# Patient Record
Sex: FEMALE | Race: BLACK OR AFRICAN AMERICAN | Employment: FULL TIME | URBAN - METROPOLITAN AREA
[De-identification: names, ages, dates, MRNs, and addresses within clinical notes are randomized per-mention and may not be internally consistent; named-entity substitution may affect disease eponyms.]

---

## 2022-07-13 ENCOUNTER — TELEPHONE (OUTPATIENT)
Dept: OBGYN CLINIC | Age: 37
End: 2022-07-13

## 2022-07-13 NOTE — TELEPHONE ENCOUNTER
Pt present to the office for Depo-Provera injection.  Injection administered by Milton Willis CMA in the left buttock    She needs to return on 09/28 - 10/12    [facility] [id number]  Lot # [id number]  Exp 12/31/2025

## 2022-10-11 RX ORDER — MEDROXYPROGESTERONE ACETATE 150 MG/ML
150 INJECTION, SUSPENSION INTRAMUSCULAR
Qty: 1 ML | Refills: 0 | Status: SHIPPED | OUTPATIENT
Start: 2022-10-11

## 2022-10-12 ENCOUNTER — TELEPHONE (OUTPATIENT)
Dept: OBGYN CLINIC | Age: 37
End: 2022-10-12

## 2022-10-12 NOTE — TELEPHONE ENCOUNTER
Pt present to the office for Depo-Provera injection.  Injection administered by Vinson Cheadle , CMA in the Right buttock    She needs to return on 12/28 - 01/11/23    [facility] [address] [id number]  Lot # [id number]  Exp 12/31/2025

## 2022-12-29 RX ORDER — MEDROXYPROGESTERONE ACETATE 150 MG/ML
INJECTION, SUSPENSION INTRAMUSCULAR
Refills: 3 | OUTPATIENT
Start: 2022-12-29

## 2023-01-10 ENCOUNTER — TELEPHONE (OUTPATIENT)
Dept: OBGYN CLINIC | Age: 38
End: 2023-01-10

## 2023-01-10 NOTE — TELEPHONE ENCOUNTER
Pt present to the office for Depo-Provera injection.  Injection administered by Prashanth Peña CMA in the Left buttock    She needs to return on 03/28 - 04/11    [facility] [id number]  Lot # [id number]  Exp 04/30/2026

## 2023-03-31 RX ORDER — MEDROXYPROGESTERONE ACETATE 150 MG/ML
150 INJECTION, SUSPENSION INTRAMUSCULAR
OUTPATIENT
Start: 2023-03-31

## 2023-04-04 DIAGNOSIS — Z30.013 ENCOUNTER FOR PRESCRIPTION FOR DEPO-PROVERA: Primary | ICD-10-CM

## 2023-04-05 RX ORDER — MEDROXYPROGESTERONE ACETATE 150 MG/ML
150 INJECTION, SUSPENSION INTRAMUSCULAR
Qty: 1 ML | Refills: 0 | Status: SHIPPED | OUTPATIENT
Start: 2023-04-05

## 2023-04-05 NOTE — TELEPHONE ENCOUNTER
Pt needs WWE scheduled.     Orders Placed This Encounter    medroxyPROGESTERone (DEPO-PROVERA) 150 MG/ML injection     Sig: Inject 150 mg into the muscle every 3 months     Dispense:  1 mL     Refill:  0

## 2023-07-06 DIAGNOSIS — Z30.013 ENCOUNTER FOR PRESCRIPTION FOR DEPO-PROVERA: ICD-10-CM

## 2023-07-06 RX ORDER — MEDROXYPROGESTERONE ACETATE 150 MG/ML
150 INJECTION, SUSPENSION INTRAMUSCULAR
OUTPATIENT
Start: 2023-07-06

## 2023-07-11 DIAGNOSIS — Z30.013 ENCOUNTER FOR PRESCRIPTION FOR DEPO-PROVERA: ICD-10-CM

## 2023-07-11 RX ORDER — MEDROXYPROGESTERONE ACETATE 150 MG/ML
150 INJECTION, SUSPENSION INTRAMUSCULAR
OUTPATIENT
Start: 2023-07-11

## 2023-07-14 ENCOUNTER — HOSPITAL ENCOUNTER (EMERGENCY)
Age: 38
Discharge: HOME | End: 2023-07-14
Attending: GENERAL PRACTICE
Payer: COMMERCIAL

## 2023-07-14 ENCOUNTER — APPOINTMENT (OUTPATIENT)
Dept: GENERAL RADIOLOGY | Age: 38
End: 2023-07-14
Payer: COMMERCIAL

## 2023-07-14 VITALS
WEIGHT: 150 LBS | HEART RATE: 58 BPM | OXYGEN SATURATION: 99 % | RESPIRATION RATE: 15 BRPM | HEIGHT: 67 IN | BODY MASS INDEX: 23.54 KG/M2 | TEMPERATURE: 98.2 F | SYSTOLIC BLOOD PRESSURE: 122 MMHG | DIASTOLIC BLOOD PRESSURE: 87 MMHG

## 2023-07-14 DIAGNOSIS — S76.312A STRAIN OF LEFT HAMSTRING, INITIAL ENCOUNTER: ICD-10-CM

## 2023-07-14 DIAGNOSIS — S80.01XA CONTUSION OF RIGHT KNEE, INITIAL ENCOUNTER: Primary | ICD-10-CM

## 2023-07-14 PROCEDURE — 6360000002 HC RX W HCPCS: Performed by: GENERAL PRACTICE

## 2023-07-14 PROCEDURE — 73562 X-RAY EXAM OF KNEE 3: CPT

## 2023-07-14 PROCEDURE — 99284 EMERGENCY DEPT VISIT MOD MDM: CPT

## 2023-07-14 RX ORDER — KETOROLAC TROMETHAMINE 30 MG/ML
30 INJECTION, SOLUTION INTRAMUSCULAR; INTRAVENOUS ONCE
Status: COMPLETED | OUTPATIENT
Start: 2023-07-14 | End: 2023-07-14

## 2023-07-14 RX ADMIN — KETOROLAC TROMETHAMINE 30 MG: 30 INJECTION, SOLUTION INTRAMUSCULAR; INTRAVENOUS at 05:31

## 2023-07-14 ASSESSMENT — PAIN SCALES - GENERAL
PAINLEVEL_OUTOF10: 9
PAINLEVEL_OUTOF10: 9

## 2023-07-14 ASSESSMENT — LIFESTYLE VARIABLES
HOW OFTEN DO YOU HAVE A DRINK CONTAINING ALCOHOL: 2-3 TIMES A WEEK
HOW MANY STANDARD DRINKS CONTAINING ALCOHOL DO YOU HAVE ON A TYPICAL DAY: 1 OR 2
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
## 2023-07-23 DIAGNOSIS — Z30.013 ENCOUNTER FOR PRESCRIPTION FOR DEPO-PROVERA: ICD-10-CM

## 2023-07-24 ENCOUNTER — NURSE ONLY (OUTPATIENT)
Dept: OBGYN CLINIC | Age: 38
End: 2023-07-24

## 2023-07-24 DIAGNOSIS — Z30.013 ENCOUNTER FOR PRESCRIPTION FOR DEPO-PROVERA: ICD-10-CM

## 2023-07-24 RX ORDER — MEDROXYPROGESTERONE ACETATE 150 MG/ML
150 INJECTION, SUSPENSION INTRAMUSCULAR
Qty: 1 ML | Refills: 0 | Status: SHIPPED | OUTPATIENT
Start: 2023-07-24

## 2023-07-24 RX ORDER — MEDROXYPROGESTERONE ACETATE 150 MG/ML
150 INJECTION, SUSPENSION INTRAMUSCULAR
OUTPATIENT
Start: 2023-07-24

## 2023-07-24 NOTE — PROGRESS NOTES
Orders Placed This Encounter    medroxyPROGESTERone (DEPO-PROVERA) 150 MG/ML injection     Sig: Inject 150 mg into the muscle every 3 months     Dispense:  1 mL     Refill:  0        [facility] scheduled for 7/31/2023

## 2023-07-24 NOTE — PROGRESS NOTES
Patient showed for injection - did not have medication . Reports not having medication with her. Patient was suppose to schedule yearly exam in April to get next refill. She did not.  Patient scheduling today - will send in medication to get to yearly exam.

## 2023-07-27 ENCOUNTER — OFFICE VISIT (OUTPATIENT)
Dept: ORTHOPEDIC SURGERY | Age: 38
End: 2023-07-27
Payer: COMMERCIAL

## 2023-07-27 DIAGNOSIS — M70.50 PES ANSERINE BURSITIS: ICD-10-CM

## 2023-07-27 DIAGNOSIS — M25.561 ACUTE PAIN OF RIGHT KNEE: Primary | ICD-10-CM

## 2023-07-27 DIAGNOSIS — M25.562 CHRONIC PAIN OF LEFT KNEE: ICD-10-CM

## 2023-07-27 DIAGNOSIS — G89.29 CHRONIC PAIN OF LEFT KNEE: ICD-10-CM

## 2023-07-27 PROCEDURE — 99204 OFFICE O/P NEW MOD 45 MIN: CPT | Performed by: SPECIALIST/TECHNOLOGIST

## 2023-07-27 PROCEDURE — 20610 DRAIN/INJ JOINT/BURSA W/O US: CPT | Performed by: SPECIALIST/TECHNOLOGIST

## 2023-07-27 RX ORDER — DICLOFENAC SODIUM 75 MG/1
75 TABLET, DELAYED RELEASE ORAL 2 TIMES DAILY
Qty: 60 TABLET | Refills: 0 | Status: SHIPPED | OUTPATIENT
Start: 2023-07-27

## 2023-07-27 RX ORDER — TRIAMCINOLONE ACETONIDE 40 MG/ML
40 INJECTION, SUSPENSION INTRA-ARTICULAR; INTRAMUSCULAR ONCE
Status: COMPLETED | OUTPATIENT
Start: 2023-07-27 | End: 2023-07-27

## 2023-07-27 RX ADMIN — TRIAMCINOLONE ACETONIDE 40 MG: 40 INJECTION, SUSPENSION INTRA-ARTICULAR; INTRAMUSCULAR at 09:27

## 2023-07-27 NOTE — PROGRESS NOTES
patellar apprehension test, positive patellar grind. Imaging:   I reviewed 3 views of the left knee performed in the emergency department on 7/14/2023 which shows no acute fracture or dislocation, mild degenerative changes localized to the medial compartment with osteophyte formation. I reviewed 3 views of the right knee performed in the emergency department on 7/14/2023 which shows no acute fracture, dislocation or advanced degenerative changes. Tunnels and hardware consistent with postoperative ACL reconstruction. All imaging interpreted by myself Wilmer Friedman MD independent of radiology review        Assessment:     ICD-10-CM    1. Acute pain of right knee  M25.561 MRI KNEE RIGHT WO CONTRAST      2. Pes anserine bursitis  M70.50 MRI KNEE RIGHT WO CONTRAST     triamcinolone acetonide (KENALOG-40) injection 40 mg     DRAIN/INJECT LARGE JOINT/BURSA     Ambulatory referral to Physical Therapy      3. Chronic pain of left knee  M25.562 MRI KNEE RIGHT WO CONTRAST    G89.29 triamcinolone acetonide (KENALOG-40) injection 40 mg     DRAIN/INJECT LARGE JOINT/BURSA     Ambulatory referral to Physical Therapy          Plan:  I think the majority of the patient's right knee pain may be due to a meniscal pathology although I do think she does have an anterior contusion also which may be contributing to her pain. She did have an acute fall and was having no pain in this knee prior to the fall. In conjunction with her acute fall and her clinical exam I think it is reasonable to evaluate her right knee further with an MRI. She will return after MRI for definitive treatment. With regards to her left knee I think the majority of her symptoms are hamstring tendinitis and pes anserine bursitis. She also has some osteoarthritic changes on plain radiograph however I think the majority of her symptoms are extra-articular in nature.   I discussed with the patient conservative treatment options to include corticosteroid

## 2023-08-09 ENCOUNTER — OFFICE VISIT (OUTPATIENT)
Dept: OBGYN CLINIC | Age: 38
End: 2023-08-09
Payer: COMMERCIAL

## 2023-08-09 VITALS
HEIGHT: 67 IN | SYSTOLIC BLOOD PRESSURE: 122 MMHG | BODY MASS INDEX: 24.8 KG/M2 | DIASTOLIC BLOOD PRESSURE: 60 MMHG | WEIGHT: 158 LBS

## 2023-08-09 DIAGNOSIS — Z30.42 SURVEILLANCE FOR DEPO-PROVERA CONTRACEPTION: ICD-10-CM

## 2023-08-09 DIAGNOSIS — Z13.89 SCREENING FOR GENITOURINARY CONDITION: ICD-10-CM

## 2023-08-09 DIAGNOSIS — Z01.419 WELL WOMAN EXAM: Primary | ICD-10-CM

## 2023-08-09 DIAGNOSIS — Z30.013 ENCOUNTER FOR PRESCRIPTION FOR DEPO-PROVERA: ICD-10-CM

## 2023-08-09 LAB
BILIRUBIN, URINE, POC: NEGATIVE
BLOOD URINE, POC: NORMAL
GLUCOSE URINE, POC: NEGATIVE
HCG SERPL-ACNC: <1 MIU/ML (ref 0–6)
KETONES, URINE, POC: NEGATIVE
LEUKOCYTE ESTERASE, URINE, POC: NORMAL
NITRITE, URINE, POC: NEGATIVE
PH, URINE, POC: 5.5 (ref 4.6–8)
PROTEIN,URINE, POC: NORMAL
SPECIFIC GRAVITY, URINE, POC: 1.02 (ref 1–1.03)
URINALYSIS CLARITY, POC: CLEAR
URINALYSIS COLOR, POC: YELLOW
UROBILINOGEN, POC: NORMAL

## 2023-08-09 PROCEDURE — 81003 URINALYSIS AUTO W/O SCOPE: CPT | Performed by: NURSE PRACTITIONER

## 2023-08-09 PROCEDURE — 99395 PREV VISIT EST AGE 18-39: CPT | Performed by: NURSE PRACTITIONER

## 2023-08-09 RX ORDER — MEDROXYPROGESTERONE ACETATE 150 MG/ML
150 INJECTION, SUSPENSION INTRAMUSCULAR
Qty: 1 ML | Refills: 3 | Status: SHIPPED | OUTPATIENT
Start: 2023-08-09

## 2023-08-09 NOTE — PROGRESS NOTES
Patient presents today for a routine gynecological examination with no complaints. Needs depo refills. Outside of depo window- needs quant also     OB History          5    Para   5    Term   2            AB   0    Living   4         SAB   0    IAB        Ectopic        Molar        Multiple   1    Live Births   2              Last pap:2021 negative, hpv negative      GYN History           No LMP recorded (exact date). Patient has had an injection. negative postcoital bleeding    Past Medical History:  History reviewed. No pertinent past medical history. Past Surgical History:  Past Surgical History:   Procedure Laterality Date     SECTION      x4    ORTHOPEDIC SURGERY      OTHER SURGICAL HISTORY      oral    TUBAL LIGATION         Allergies:   No Known Allergies    Medication History:  Current Outpatient Medications   Medication Sig Dispense Refill    medroxyPROGESTERone (DEPO-PROVERA) 150 MG/ML injection Inject 150 mg into the muscle every 3 months 1 mL 3    diclofenac (VOLTAREN) 75 MG EC tablet Take 1 tablet by mouth 2 times daily 60 tablet 0     No current facility-administered medications for this visit.        Social History:  Social History     Socioeconomic History    Marital status: Single     Spouse name: Not on file    Number of children: Not on file    Years of education: Not on file    Highest education level: Not on file   Occupational History    Not on file   Tobacco Use    Smoking status: Every Day    Smokeless tobacco: Never   Substance and Sexual Activity    Alcohol use: Yes    Drug use: Never    Sexual activity: Not on file   Other Topics Concern    Not on file   Social History Narrative    Not on file     Social Determinants of Health     Financial Resource Strain: Not on file   Food Insecurity: Not on file   Transportation Needs: Not on file   Physical Activity: Not on file   Stress: Not on file   Social Connections: Not on file   Intimate Partner Violence: Not on

## 2023-08-10 ENCOUNTER — TELEPHONE (OUTPATIENT)
Dept: OBGYN CLINIC | Age: 38
End: 2023-08-10

## 2023-08-10 NOTE — TELEPHONE ENCOUNTER
Pt present to the office for Depo-Provera injection.   Neha Myers drawn on 08/09/2023 was negative    [facility] [id number]  Lot # [id number]  Exp 02/28/2027  Site LGM  Return 10/26 - 11/09 for next injection

## 2023-09-13 ENCOUNTER — OFFICE VISIT (OUTPATIENT)
Dept: ORTHOPEDIC SURGERY | Age: 38
End: 2023-09-13

## 2023-09-13 DIAGNOSIS — M17.11 PRIMARY OSTEOARTHRITIS OF RIGHT KNEE: Primary | ICD-10-CM

## 2023-09-13 DIAGNOSIS — M70.50 PES ANSERINE BURSITIS: ICD-10-CM

## 2023-09-13 RX ORDER — TRIAMCINOLONE ACETONIDE 40 MG/ML
40 INJECTION, SUSPENSION INTRA-ARTICULAR; INTRAMUSCULAR ONCE
Status: COMPLETED | OUTPATIENT
Start: 2023-09-13 | End: 2023-09-13

## 2023-09-13 RX ADMIN — TRIAMCINOLONE ACETONIDE 40 MG: 40 INJECTION, SUSPENSION INTRA-ARTICULAR; INTRAMUSCULAR at 08:45

## 2023-09-13 NOTE — PROGRESS NOTES
Name: Nataliia Zimmerman  YOB: 1985  Gender: female  MRN: [id number]      CC: Follow-up (Right knee MRI and left knee injection )       HPI: Nataliia Zimmerman is a 44 y.o. female who returns for follow up on  bilateral knees. She is here to today to discuss MRI results for her right knee. She reports that the left knee injection only provided relief for 1 week and that her right knee is more painful since her last visit. Reports that she had to leave work recently due to the right knee pain. Physical Examination:  General: no acute distress  Lungs: breathing easily  CV: regular rhythm by pulse  Right knee: Minimal effusion present. Tenderness to palpation over the medial and lateral joint line. Full active flexion with extension 2 degrees shy of full hyperextension compared to the contralateral side. Negative ligamentous exam x4. Negative Joao's, positive bounce home test.  Left knee: Minimal effusion present. Tenderness to palpation over the medial joint line. Full active and passive range of motion with mild pain in the extremes. Negative ligamentous exam x4. Negative Joao's, bounce home test.    Imaging: I reviewed an MRI performed in our system on 8/2/2023 which shows status post ACL reconstruction with intact graft. Cruciate and collateral ligaments and the meniscus are unremarkable. Moderate diffuse chondral thinning and surface irregularity with high-grade chondral loss patellar facet. Full-thickness chondral fissuring of the lateral trochlea. Full-thickness chondral fissuring in the anterior weightbearing surface of the lateral femoral condyle. Assessment:   1. Primary osteoarthritis of right knee    2. Pes anserine bursitis         Plan:   Patient reports that she has some mild achiness in her left knee but got improvement from the Pes anserine bursa injection however she did not attend formal physical therapy.   I would recommend that she be referred to

## 2023-10-26 ENCOUNTER — OFFICE VISIT (OUTPATIENT)
Dept: ORTHOPEDIC SURGERY | Age: 38
End: 2023-10-26

## 2023-10-26 DIAGNOSIS — M25.561 PAIN IN BOTH KNEES, UNSPECIFIED CHRONICITY: Primary | ICD-10-CM

## 2023-10-26 DIAGNOSIS — M25.562 PAIN IN BOTH KNEES, UNSPECIFIED CHRONICITY: Primary | ICD-10-CM

## 2023-10-26 NOTE — PROGRESS NOTES
Reddie Hinge brace for patients bilateral knee. I explained how the hinge on each side of the brace should line up with the patella. The patient was also instructed to tighten the strap distal to the patella first to insure the brace is anchored and prevents slipping, , then the top strap should be tightened. Patient read and signed documenting they understand and agree to [facility]'s current DME return policy.
valgus stress at full extension and 30 degrees of flexion. Negative Lachman's. Negative posterior drawer. No McMurrays over medial or lateral joint line. Calf is soft and nontender to palpation. Motor and sensory intact distally. Dorsalis pedis pulse 2+. Left knee:  No previous surgical scars present. No joint effusion present. Patella tracks centrally with no patellofemoral grind. Neutral mechanical alignment present. Passive ROM: 5 degrees of hyperextension with 120 degrees of flexion. Stable to varus and valgus stress at full extension and 30 degrees of flexion. Negative Lachman's. Negative posterior drawer. No pain with McMurrays over medial or lateral joint line. Calf is soft and nontender to palpation. Motor and sensory intact distally. Dorsalis pedis pulse 2+. I did review x-ray images of her right and left knee. I agree no acute findings noted. She does have arthritic changes present. On the left knee she has large osteophyte formation present along the femoral condyles and during skiers view has complete collapse of medial and lateral joint line. On the right knee she has the screws from her ACL reconstruction as well as some advanced arthritic changes present. All imaging interpreted by myself Florin Walton PA-C independent of radiology review    Assessment:     ICD-10-CM    1. Pain in both knees, unspecified chronicity  M25.561 Ambulatory Referral to DME    M25.562 Ambulatory referral to Physical Therapy          Plan:   I do think that she has some quad and hamstring weakness bilaterally, we discussed this at length. I know she will benefit from a formal physical therapy regimen. She states that she can get to [facility] in the morning before work and she will go to physical therapy 2 times a week for 6 to 8 weeks to work on quad strengthening. She notes some buckling and bouts of instability bilaterally.   I also think she will benefit from bilateral hinged braces which we

## 2023-11-02 ENCOUNTER — TELEPHONE (OUTPATIENT)
Dept: OBGYN CLINIC | Age: 38
End: 2023-11-02

## 2023-11-02 NOTE — TELEPHONE ENCOUNTER
Pt present to the office for Depo-Provera injection.       [facility] [id number]  Lot # [id number]  Exp 03/31/2027  Site RGM  Return 01/18 - 02/01/24 for next injection

## 2023-12-31 ENCOUNTER — APPOINTMENT (OUTPATIENT)
Dept: GENERAL RADIOLOGY | Age: 38
End: 2023-12-31
Payer: COMMERCIAL

## 2023-12-31 ENCOUNTER — HOSPITAL ENCOUNTER (EMERGENCY)
Age: 38
Discharge: HOME | End: 2023-12-31
Attending: EMERGENCY MEDICINE
Payer: COMMERCIAL

## 2023-12-31 ENCOUNTER — APPOINTMENT (OUTPATIENT)
Dept: CT IMAGING | Age: 38
End: 2023-12-31
Payer: COMMERCIAL

## 2023-12-31 VITALS
RESPIRATION RATE: 16 BRPM | WEIGHT: 150 LBS | SYSTOLIC BLOOD PRESSURE: 119 MMHG | HEART RATE: 71 BPM | HEIGHT: 67 IN | DIASTOLIC BLOOD PRESSURE: 87 MMHG | OXYGEN SATURATION: 99 % | BODY MASS INDEX: 23.54 KG/M2 | TEMPERATURE: 98.6 F

## 2023-12-31 DIAGNOSIS — M25.562 PAIN IN BOTH KNEES, UNSPECIFIED CHRONICITY: ICD-10-CM

## 2023-12-31 DIAGNOSIS — M25.561 PAIN IN BOTH KNEES, UNSPECIFIED CHRONICITY: ICD-10-CM

## 2023-12-31 DIAGNOSIS — S00.93XA CONTUSION OF HEAD, UNSPECIFIED PART OF HEAD, INITIAL ENCOUNTER: Primary | ICD-10-CM

## 2023-12-31 DIAGNOSIS — S00.03XA HEMATOMA OF LEFT PARIETAL SCALP, INITIAL ENCOUNTER: ICD-10-CM

## 2023-12-31 PROCEDURE — 70450 CT HEAD/BRAIN W/O DYE: CPT

## 2023-12-31 PROCEDURE — 96372 THER/PROPH/DIAG INJ SC/IM: CPT

## 2023-12-31 PROCEDURE — 99284 EMERGENCY DEPT VISIT MOD MDM: CPT

## 2023-12-31 PROCEDURE — 73562 X-RAY EXAM OF KNEE 3: CPT

## 2023-12-31 PROCEDURE — 6360000002 HC RX W HCPCS: Performed by: EMERGENCY MEDICINE

## 2023-12-31 RX ORDER — KETOROLAC TROMETHAMINE 30 MG/ML
30 INJECTION, SOLUTION INTRAMUSCULAR; INTRAVENOUS ONCE
Status: COMPLETED | OUTPATIENT
Start: 2023-12-31 | End: 2023-12-31

## 2023-12-31 RX ADMIN — KETOROLAC TROMETHAMINE 30 MG: 30 INJECTION, SOLUTION INTRAMUSCULAR at 06:01

## 2023-12-31 ASSESSMENT — PAIN DESCRIPTION - LOCATION
LOCATION: KNEE
LOCATION: HEAD;KNEE;BACK

## 2023-12-31 ASSESSMENT — PAIN SCALES - GENERAL
PAINLEVEL_OUTOF10: 8
PAINLEVEL_OUTOF10: 8

## 2023-12-31 ASSESSMENT — PAIN DESCRIPTION - DESCRIPTORS: DESCRIPTORS: ACHING;SHARP

## 2023-12-31 ASSESSMENT — LIFESTYLE VARIABLES
HOW MANY STANDARD DRINKS CONTAINING ALCOHOL DO YOU HAVE ON A TYPICAL DAY: 1 OR 2
HOW OFTEN DO YOU HAVE A DRINK CONTAINING ALCOHOL: 2-3 TIMES A WEEK

## 2023-12-31 ASSESSMENT — PAIN DESCRIPTION - ORIENTATION: ORIENTATION: RIGHT;LEFT

## 2023-12-31 ASSESSMENT — PAIN - FUNCTIONAL ASSESSMENT: PAIN_FUNCTIONAL_ASSESSMENT: 0-10
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
## 2023-12-31 NOTE — ED NOTES
I have reviewed discharge instructions with the patient.  The patient verbalized understanding.    Patient left ED via Discharge Method: ambulatory to Home with self.    Opportunity for questions and clarification provided.       Patient given 0 scripts.         To continue your aftercare when you leave the hospital, you may receive an automated call from our care team to check in on how you are doing.  This is a free service and part of our promise to provide the best care and service to meet your aftercare needs.” If you have questions, or wish to unsubscribe from this service please call [phone].  Thank you for Choosing our [facility] Emergency Department.       Tino Malin RN  12/31/23 0738
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
## 2024-01-10 ENCOUNTER — TELEPHONE (OUTPATIENT)
Dept: ORTHOPEDIC SURGERY | Age: 39
End: 2024-01-10

## 2024-01-10 NOTE — TELEPHONE ENCOUNTER
She was dancing on Saturday and her right knee cap felt like it popped out of place. She cannot weight bare and it is swollen. She wants to know if she can be worked in soon. She is in tears. Please give her a call.

## 2024-01-11 ENCOUNTER — OFFICE VISIT (OUTPATIENT)
Dept: ORTHOPEDIC SURGERY | Age: 39
End: 2024-01-11
Payer: COMMERCIAL

## 2024-01-11 VITALS — WEIGHT: 150 LBS | HEIGHT: 67 IN | BODY MASS INDEX: 23.54 KG/M2

## 2024-01-11 DIAGNOSIS — M25.561 ACUTE PAIN OF RIGHT KNEE: ICD-10-CM

## 2024-01-11 DIAGNOSIS — M17.11 PRIMARY OSTEOARTHRITIS OF RIGHT KNEE: Primary | ICD-10-CM

## 2024-01-11 PROCEDURE — 99214 OFFICE O/P EST MOD 30 MIN: CPT | Performed by: PHYSICIAN ASSISTANT

## 2024-01-11 RX ORDER — METHYLPREDNISOLONE 4 MG/1
TABLET ORAL
Qty: 1 KIT | Refills: 0 | Status: SHIPPED | OUTPATIENT
Start: 2024-01-11

## 2024-01-11 NOTE — PROGRESS NOTES
Name: Cyndi Fay  YOB: 1985  Gender: female  MRN: [id number]      CC: Knee Pain (R)       HPI: Cyndi Fay is a 38 y.o. female who presents with Knee Pain (R)  She was dancing at a birthday party and she felt a pop in the right knee this occurred on Saturday, 1/6/2024.. She was unable to bear weight. She had a brace at home that she started wearing. She has had no formal treatment.  She does not take anything for pain at this time.  She states that she has had previous patella dislocations in this right knee but this felt a little bit different.   She that she had significant swelling after this injury.  She is not able to put weight on this knee for long periods of time.  She is not able to go to work due to the increased pain and swelling.  She notes very little improvement from her injury on Saturday.    ROS/Meds/PSH/PMH/FH/SH: I personally reviewed the patients standard intake form.  Below are the pertinents    Tobacco:  reports that she has been smoking cigarettes. She has never used smokeless tobacco.  Diabetes: none  Other: none    Physical Examination:  General: no acute distress  Lungs: breathing easily  CV: regular rhythm by pulse  Right Knee: Previous surgical scars present. Large joint effusion present. Patella tracks centrally with no patellofemoral grind. Neutral mechanical alignment present. Passive ROM: 5-100 pain with all ROM.  Extensor mechanism intact.  Stable to varus and valgus stress at full extension and 30 degrees of flexion. Equivocal Lachman's. Equivocal posterior drawer. Pain with McMurrays over medial and lateral joint line. Calf is soft and nontender to palpation. Motor and sensory intact distally. Dorsalis pedis pulse 2+.      Imaging:     Knee XR: 4 views     Clinical Indication  1. Primary osteoarthritis of right knee    2. Acute pain of right knee           Report: AP, lateral, PA flexion, sunrise views of the Right knee demonstrates no acute fracture

## 2024-01-15 ENCOUNTER — CLINICAL DOCUMENTATION (OUTPATIENT)
Dept: ORTHOPEDIC SURGERY | Age: 39
End: 2024-01-15

## 2024-01-15 ENCOUNTER — TELEPHONE (OUTPATIENT)
Dept: ORTHOPEDIC SURGERY | Age: 39
End: 2024-01-15

## 2024-01-18 ENCOUNTER — TELEPHONE (OUTPATIENT)
Dept: ORTHOPEDIC SURGERY | Age: 39
End: 2024-01-18

## 2024-01-18 NOTE — TELEPHONE ENCOUNTER
Returning a call she missed about whether or not her form needs to be filled out now? Please call her and she can explain what Jenny has told her.

## 2024-01-19 ENCOUNTER — CLINICAL DOCUMENTATION (OUTPATIENT)
Dept: ORTHOPEDIC SURGERY | Age: 39
End: 2024-01-19

## 2024-01-22 NOTE — PROGRESS NOTES
Name: Cyndi Fay  YOB: 1985  Gender: female  MRN: [id number]      CC: Knee pain (R)       HPI: Cyndi Fay is a 38 y.o. female who returns for follow up and MRI results on right knee.        Physical Examination:  General: no acute distress  Lungs: breathing easily  CV: regular rhythm by pulse  {body part:18031}:***    Imaging:   ***  All imaging interpreted by myself Catherine Acosta PA-C independent of radiology review    Assessment:   No diagnosis found.     Plan:   ***            Catherine Acosta PA-C   Orthopaedics and Sports Medicine

## 2024-01-23 ENCOUNTER — OFFICE VISIT (OUTPATIENT)
Dept: ORTHOPEDIC SURGERY | Age: 39
End: 2024-01-23
Payer: COMMERCIAL

## 2024-01-23 ENCOUNTER — TELEPHONE (OUTPATIENT)
Dept: ORTHOPEDIC SURGERY | Age: 39
End: 2024-01-23

## 2024-01-23 DIAGNOSIS — S83.511A RUPTURE OF ANTERIOR CRUCIATE LIGAMENT OF RIGHT KNEE, INITIAL ENCOUNTER: ICD-10-CM

## 2024-01-23 DIAGNOSIS — M25.561 ACUTE PAIN OF RIGHT KNEE: ICD-10-CM

## 2024-01-23 DIAGNOSIS — S83.241A ACUTE MEDIAL MENISCUS TEAR OF RIGHT KNEE, INITIAL ENCOUNTER: Primary | ICD-10-CM

## 2024-01-23 PROCEDURE — 99204 OFFICE O/P NEW MOD 45 MIN: CPT | Performed by: ORTHOPAEDIC SURGERY

## 2024-01-23 NOTE — TELEPHONE ENCOUNTER
She was seen today and was supposed to call [facility] to schedule a CT of her knee. She called and they did not get the order. Can you please fax to them.

## 2024-01-23 NOTE — PROGRESS NOTES
Name: Cyndi Fay  YOB: 1985  Gender: female  MRN: [id number]    CC: Knee Pain (R)     HPI: Cyndi Fay is a 38 y.o. female who returns for follow up and MRI results on  her right knee.    Physical Examination:  General: no acute distress  Lungs: breathing easily  CV: regular rhythm by pulse  {body part:48729}:***    Imaging:   ***  All imaging interpreted by myself Wilmer Monterroso MD independent of radiology review    Assessment:   No diagnosis found.     Plan:   ***            Wilmer Monterroso MD, FAAOS  Orthopaedics and Sports Medicine

## 2024-01-23 NOTE — PROGRESS NOTES
Name: Cyndi Fay  YOB: 1985  Gender: female  MRN: [id number]    CC: Knee Pain (R)     HPI: Cyndi Fay is a 38 y.o. female who presents today for surgical consultation of her right knee and MRI results.  She was seen previously by Catherine Acosta.  She suffered an injury about 4 weeks ago while dancing in which she felt pop and shift of her knee with acute onset of pain and swelling.  She has a history of an ACL reconstruction about 20 years ago from a basketball injury.  She states she has not trusted her knee since that time and said intermittent giving way of her knee.  However she had severe increase in pain mostly over the medial joint line since this past injury.  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    Physical Examination:  General: no acute distress  Lungs: breathing easily  CV: regular rhythm by pulse  Right Knee:Tenderness to palpation of the medial joint line.  Recreation of symptoms with that.  Passive extension just shy of neutral.  Still has a mild to moderate effusion.  Pain with Joao's of the medial joint line with recreation of symptoms.  Stable to varus and valgus stress at 0 and 30 degrees.  She has positive Lachman's with a soft endpoint at most but asymmetry compared to the contralateral side.  Increased translation on anterior drawer.  Negative posterior drawer tibial station anterior to the femoral condyles.    Imaging:   I reviewed scan of her right knee which demonstrates a bucket-handle medial meniscus tear with the majority of the meniscus flipped into the intercondylar notch.  I see a small amount of remnant ACL graft tissue with laxity and I do not appreciate intact ACL.There is a posterior horn lateral meniscus tear     All imaging interpreted by myself Wilmer Monterroso MD independent of radiology review    Assessment:     ICD-10-CM    1. Acute medial meniscus tear of right knee, initial encounter  S83.241A       2. Acute pain of right knee  M25.561 CT KNEE

## 2024-01-26 DIAGNOSIS — M25.561 ACUTE PAIN OF RIGHT KNEE: ICD-10-CM

## 2024-01-29 ENCOUNTER — CLINICAL DOCUMENTATION (OUTPATIENT)
Dept: ORTHOPEDIC SURGERY | Age: 39
End: 2024-01-29

## 2024-02-01 ENCOUNTER — TELEPHONE (OUTPATIENT)
Dept: OBGYN CLINIC | Age: 39
End: 2024-02-01

## 2024-02-01 NOTE — TELEPHONE ENCOUNTER
Pt present to the office for Depo-Provera 150 mg/ml IM injection.     [facility] 72100-566-99  Lot # [id number]  Exp 04/30/2027  Site LGM  Return 04/19 - 05/03 for next injection

## 2024-02-02 ENCOUNTER — TELEPHONE (OUTPATIENT)
Dept: ORTHOPEDIC SURGERY | Age: 39
End: 2024-02-02

## 2024-02-02 ENCOUNTER — CLINICAL DOCUMENTATION (OUTPATIENT)
Dept: ORTHOPEDIC SURGERY | Age: 39
End: 2024-02-02

## 2024-02-02 NOTE — TELEPHONE ENCOUNTER
She is confused about the dates on the work note about remaining out prior to the CT scan, she wanted to know if that had to do with the [place] form

## 2024-02-05 NOTE — H&P (VIEW-ONLY)
discussed the risk of anesthesia complications postoperative numbness stiffness possible need for further surgery.  Risk of postoperative DVT/PE infection the extended rehab course associated with the procedure with a likely 12 month total recovery as well as the precautions associated with that.  All of the patient's questions have been answered and the patient elects to proceed as planned       Surgical plan to be for right knee arthroscopy revision ACL reconstruction with allograft and medial meniscus repair      Wilmer Monterroso MD, FAAOS  Orthopaedics and Sports Medicine

## 2024-02-06 ENCOUNTER — OFFICE VISIT (OUTPATIENT)
Dept: ORTHOPEDIC SURGERY | Age: 39
End: 2024-02-06
Payer: COMMERCIAL

## 2024-02-06 DIAGNOSIS — S83.241D ACUTE MEDIAL MENISCUS TEAR OF RIGHT KNEE, SUBSEQUENT ENCOUNTER: Primary | ICD-10-CM

## 2024-02-06 DIAGNOSIS — S83.511A RUPTURE OF ANTERIOR CRUCIATE LIGAMENT OF RIGHT KNEE, INITIAL ENCOUNTER: ICD-10-CM

## 2024-02-06 PROCEDURE — L1833 KO ADJ JNT POS R SUP PRE OTS: HCPCS | Performed by: ORTHOPAEDIC SURGERY

## 2024-02-06 PROCEDURE — E0114 CRUTCH UNDERARM PAIR NO WOOD: HCPCS | Performed by: ORTHOPAEDIC SURGERY

## 2024-02-06 PROCEDURE — 99214 OFFICE O/P EST MOD 30 MIN: CPT | Performed by: ORTHOPAEDIC SURGERY

## 2024-02-07 DIAGNOSIS — M70.50 PES ANSERINE BURSITIS: ICD-10-CM

## 2024-02-07 DIAGNOSIS — S83.241D ACUTE MEDIAL MENISCUS TEAR OF RIGHT KNEE, SUBSEQUENT ENCOUNTER: ICD-10-CM

## 2024-02-07 DIAGNOSIS — M25.561 ACUTE PAIN OF RIGHT KNEE: ICD-10-CM

## 2024-02-07 DIAGNOSIS — M17.11 PRIMARY OSTEOARTHRITIS OF RIGHT KNEE: ICD-10-CM

## 2024-02-07 DIAGNOSIS — S83.241A ACUTE MEDIAL MENISCUS TEAR OF RIGHT KNEE, INITIAL ENCOUNTER: ICD-10-CM

## 2024-02-07 DIAGNOSIS — S83.511A RUPTURE OF ANTERIOR CRUCIATE LIGAMENT OF RIGHT KNEE, INITIAL ENCOUNTER: Primary | ICD-10-CM

## 2024-02-07 NOTE — PROGRESS NOTES
The brace was properly aligned medially and laterally along the length of the right Knee with the extension/flexion dials placed slightly above the patella (to insure that if brace slides down slightly the dials will still line up on either side of the patella/knee region). The brace is extended/shorten to the patient's leg length and to insure proper fit of the brace. The straps are tightened starting with the most distal strap and then strap proximal to the patella. The strap right below the patella is then secured and last is the strap highest on the quad. The straps are then trimmed for easier tightening of the brace for the patient.     Patient was fitted and instruted on the use of a crutches. The crutches  was adjusted to the patient's height and arm length. Patient walked around with the crutches  to insure it was set at a comfortable height. I explained that patient needs tennis shoes on when using the crutches to insure no injury's .     Patient read and signed documenting they understand and agree to [facility]'s current DME return policy.   
discussed the risk of anesthesia complications postoperative numbness stiffness possible need for further surgery.  Risk of postoperative DVT/PE infection the extended rehab course associated with the procedure with a likely 12 month total recovery as well as the precautions associated with that.  All of the patient's questions have been answered and the patient elects to proceed as planned       Surgical plan to be for right knee arthroscopy revision ACL reconstruction with allograft and medial meniscus repair      Wilmer Monterroso MD, FAAOS  Orthopaedics and Sports Medicine

## 2024-02-19 NOTE — PERIOP NOTE
Patient verified name and .  Order for consent not found in EHR patient verifies procedure.   Type 1B surgery, Phone assessment complete.  Orders not received.  Labs per surgeon: none  Labs per anesthesia protocol: none    Patient answered medical/surgical history questions at their best of ability. All prior to admission medications documented in EPIC.  Patient instructed to take the following medications the day of surgery according to anesthesia guidelines with a small sip of water: none On the day before surgery please take 2 Tylenol in the morning and then again before bed. You may use either regular or extra strength.   Hold all vitamins 7 days prior to surgery and NSAIDS 5 days prior to surgery. Prescription meds to hold:none  Patient instructed on the following:    > Arrive at [facility]PC Entrance, time of arrival to be called the day before by 1700  > NPO after midnight, unless otherwise indicated, including gum, mints, and ice chips  > Responsible adult must drive patient to the hospital, stay during surgery, and patient will need supervision 24 hours after anesthesia  > Use non moisturizing soap in shower the night before surgery and on the morning of surgery  > All piercings must be removed prior to arrival.    > Leave all valuables (money and jewelry) at home but bring insurance card and ID on DOS.   > You may be required to pay a deductible or co-pay on the day of your procedure. You can pre-pay by calling [phone] if your surgery is at the [facility] or [phone] if [place] [facility]  > Do not wear make-up, nail polish, lotions, cologne, perfumes, powders, or oil on skin. Artificial nails are not permitted.

## 2024-02-20 ENCOUNTER — ANESTHESIA EVENT (OUTPATIENT)
Dept: SURGERY | Age: 39
End: 2024-02-20
Payer: COMMERCIAL

## 2024-02-20 DIAGNOSIS — S83.241D ACUTE MEDIAL MENISCUS TEAR OF RIGHT KNEE, SUBSEQUENT ENCOUNTER: Primary | ICD-10-CM

## 2024-02-20 RX ORDER — OXYCODONE HYDROCHLORIDE 5 MG/1
5 TABLET ORAL EVERY 4 HOURS PRN
Qty: 30 TABLET | Refills: 0 | Status: SHIPPED | OUTPATIENT
Start: 2024-02-20 | End: 2024-02-25

## 2024-02-20 NOTE — PROGRESS NOTES
Preop department called to notify patient of arrival time for scheduled procedure. Instructions given to   - Arrive at OPC Entrance [address].  - Remain NPO after midnight, unless otherwise indicated, including gum, mints, and ice chips.   - Have a responsible adult to drive patient to the hospital, stay during surgery, and patient will need supervision 24 hours after anesthesia.   - Use antibacterial soap in shower the night before surgery and on the morning of surgery.       Was patient contacted: YES  Voicemail left: yes  Numbers contacted: [phone]   Arrival time: 1330

## 2024-02-21 ENCOUNTER — APPOINTMENT (OUTPATIENT)
Dept: GENERAL RADIOLOGY | Age: 39
End: 2024-02-21
Attending: ORTHOPAEDIC SURGERY
Payer: COMMERCIAL

## 2024-02-21 ENCOUNTER — HOSPITAL ENCOUNTER (OUTPATIENT)
Age: 39
Setting detail: OBSERVATION
Discharge: HOME | End: 2024-02-22
Attending: ORTHOPAEDIC SURGERY | Admitting: ORTHOPAEDIC SURGERY
Payer: COMMERCIAL

## 2024-02-21 ENCOUNTER — ANESTHESIA (OUTPATIENT)
Dept: SURGERY | Age: 39
End: 2024-02-21
Payer: COMMERCIAL

## 2024-02-21 DIAGNOSIS — Z98.890 S/P RIGHT KNEE ARTHROSCOPY: Primary | ICD-10-CM

## 2024-02-21 DIAGNOSIS — Z76.89 ENCOUNTER TO ESTABLISH CARE: ICD-10-CM

## 2024-02-21 PROBLEM — Z09 STATUS POST ORTHOPEDIC SURGERY, FOLLOW-UP EXAM: Status: ACTIVE | Noted: 2024-02-21

## 2024-02-21 PROCEDURE — 6360000002 HC RX W HCPCS: Performed by: ANESTHESIOLOGY

## 2024-02-21 PROCEDURE — 3600000014 HC SURGERY LEVEL 4 ADDTL 15MIN: Performed by: ORTHOPAEDIC SURGERY

## 2024-02-21 PROCEDURE — 2580000003 HC RX 258: Performed by: ANESTHESIOLOGY

## 2024-02-21 PROCEDURE — 6360000002 HC RX W HCPCS: Performed by: PHYSICIAN ASSISTANT

## 2024-02-21 PROCEDURE — 7100000001 HC PACU RECOVERY - ADDTL 15 MIN: Performed by: ORTHOPAEDIC SURGERY

## 2024-02-21 PROCEDURE — 2709999900 HC NON-CHARGEABLE SUPPLY: Performed by: ORTHOPAEDIC SURGERY

## 2024-02-21 PROCEDURE — 3700000000 HC ANESTHESIA ATTENDED CARE: Performed by: ORTHOPAEDIC SURGERY

## 2024-02-21 PROCEDURE — 6370000000 HC RX 637 (ALT 250 FOR IP): Performed by: ANESTHESIOLOGY

## 2024-02-21 PROCEDURE — 6360000002 HC RX W HCPCS: Performed by: NURSE ANESTHETIST, CERTIFIED REGISTERED

## 2024-02-21 PROCEDURE — 6370000000 HC RX 637 (ALT 250 FOR IP): Performed by: PHYSICIAN ASSISTANT

## 2024-02-21 PROCEDURE — A4216 STERILE WATER/SALINE, 10 ML: HCPCS | Performed by: ORTHOPAEDIC SURGERY

## 2024-02-21 PROCEDURE — 3600000004 HC SURGERY LEVEL 4 BASE: Performed by: ORTHOPAEDIC SURGERY

## 2024-02-21 PROCEDURE — 2500000003 HC RX 250 WO HCPCS: Performed by: NURSE ANESTHETIST, CERTIFIED REGISTERED

## 2024-02-21 PROCEDURE — 6360000002 HC RX W HCPCS: Performed by: ORTHOPAEDIC SURGERY

## 2024-02-21 PROCEDURE — 3700000001 HC ADD 15 MINUTES (ANESTHESIA): Performed by: ORTHOPAEDIC SURGERY

## 2024-02-21 PROCEDURE — 2720000010 HC SURG SUPPLY STERILE: Performed by: ORTHOPAEDIC SURGERY

## 2024-02-21 PROCEDURE — C1762 CONN TISS, HUMAN(INC FASCIA): HCPCS | Performed by: ORTHOPAEDIC SURGERY

## 2024-02-21 PROCEDURE — 2580000003 HC RX 258: Performed by: ORTHOPAEDIC SURGERY

## 2024-02-21 PROCEDURE — C1713 ANCHOR/SCREW BN/BN,TIS/BN: HCPCS | Performed by: ORTHOPAEDIC SURGERY

## 2024-02-21 PROCEDURE — 6360000002 HC RX W HCPCS

## 2024-02-21 PROCEDURE — 7100000000 HC PACU RECOVERY - FIRST 15 MIN: Performed by: ORTHOPAEDIC SURGERY

## 2024-02-21 PROCEDURE — 2580000003 HC RX 258: Performed by: NURSE ANESTHETIST, CERTIFIED REGISTERED

## 2024-02-21 PROCEDURE — 64447 NJX AA&/STRD FEMORAL NRV IMG: CPT | Performed by: ANESTHESIOLOGY

## 2024-02-21 DEVICE — DEVICE GRFT FIX 4.75X19.1 MM BIOCOMPOSITE SWIVELOCK: Type: IMPLANTABLE DEVICE | Site: KNEE | Status: FUNCTIONAL

## 2024-02-21 DEVICE — IMPLANTABLE DEVICE: Type: IMPLANTABLE DEVICE | Site: KNEE | Status: FUNCTIONAL

## 2024-02-21 DEVICE — FAST FIX FLX CRVD INSRTR BNDR CANN SET
Type: IMPLANTABLE DEVICE | Site: KNEE | Status: FUNCTIONAL
Brand: FAST-FIX

## 2024-02-21 DEVICE — GRAFT HUM TISS L160MM ACHILLES TEND FRZN W/ BNE BLK: Type: IMPLANTABLE DEVICE | Site: KNEE | Status: FUNCTIONAL

## 2024-02-21 DEVICE — EXTENDER BTTN FOOTPRINT 20X5MM SLT FOR ACL RECON TIGHTROPE: Type: IMPLANTABLE DEVICE | Site: KNEE | Status: FUNCTIONAL

## 2024-02-21 DEVICE — DEVICE TISS FIX FIBERTAPE SUTURE FOR INTERNALBRACE TECH STRL: Type: IMPLANTABLE DEVICE | Site: KNEE | Status: FUNCTIONAL

## 2024-02-21 DEVICE — FST FIX FLX REV CRVD INSRTR BNDR                                    CANN ST
Type: IMPLANTABLE DEVICE | Site: KNEE | Status: FUNCTIONAL
Brand: FAST-FIX

## 2024-02-21 RX ORDER — KETAMINE HYDROCHLORIDE 50 MG/ML
INJECTION, SOLUTION INTRAMUSCULAR; INTRAVENOUS PRN
Status: DISCONTINUED | OUTPATIENT
Start: 2024-02-21 | End: 2024-02-21 | Stop reason: SDUPTHER

## 2024-02-21 RX ORDER — HALOPERIDOL 5 MG/ML
1 INJECTION INTRAMUSCULAR
Status: DISCONTINUED | OUTPATIENT
Start: 2024-02-21 | End: 2024-02-21 | Stop reason: HOSPADM

## 2024-02-21 RX ORDER — DOCUSATE SODIUM 100 MG/1
100 CAPSULE, LIQUID FILLED ORAL DAILY
Status: DISCONTINUED | OUTPATIENT
Start: 2024-02-22 | End: 2024-02-22 | Stop reason: HOSPADM

## 2024-02-21 RX ORDER — DEXAMETHASONE SODIUM PHOSPHATE 10 MG/ML
INJECTION INTRAMUSCULAR; INTRAVENOUS PRN
Status: DISCONTINUED | OUTPATIENT
Start: 2024-02-21 | End: 2024-02-21 | Stop reason: SDUPTHER

## 2024-02-21 RX ORDER — LIDOCAINE HYDROCHLORIDE 20 MG/ML
INJECTION, SOLUTION EPIDURAL; INFILTRATION; INTRACAUDAL; PERINEURAL PRN
Status: DISCONTINUED | OUTPATIENT
Start: 2024-02-21 | End: 2024-02-21 | Stop reason: SDUPTHER

## 2024-02-21 RX ORDER — SODIUM CHLORIDE 0.9 % (FLUSH) 0.9 %
5-40 SYRINGE (ML) INJECTION PRN
Status: DISCONTINUED | OUTPATIENT
Start: 2024-02-21 | End: 2024-02-21 | Stop reason: HOSPADM

## 2024-02-21 RX ORDER — DEXTROSE MONOHYDRATE 100 MG/ML
INJECTION, SOLUTION INTRAVENOUS CONTINUOUS PRN
Status: DISCONTINUED | OUTPATIENT
Start: 2024-02-21 | End: 2024-02-21 | Stop reason: HOSPADM

## 2024-02-21 RX ORDER — KETOROLAC TROMETHAMINE 30 MG/ML
INJECTION, SOLUTION INTRAMUSCULAR; INTRAVENOUS PRN
Status: DISCONTINUED | OUTPATIENT
Start: 2024-02-21 | End: 2024-02-21 | Stop reason: ALTCHOICE

## 2024-02-21 RX ORDER — KETOROLAC TROMETHAMINE 30 MG/ML
30 INJECTION, SOLUTION INTRAMUSCULAR; INTRAVENOUS EVERY 6 HOURS
Status: DISCONTINUED | OUTPATIENT
Start: 2024-02-21 | End: 2024-02-22 | Stop reason: HOSPADM

## 2024-02-21 RX ORDER — ONDANSETRON 4 MG/1
4 TABLET, ORALLY DISINTEGRATING ORAL EVERY 8 HOURS PRN
Status: DISCONTINUED | OUTPATIENT
Start: 2024-02-21 | End: 2024-02-22 | Stop reason: HOSPADM

## 2024-02-21 RX ORDER — SODIUM CHLORIDE 9 MG/ML
INJECTION, SOLUTION INTRAMUSCULAR; INTRAVENOUS; SUBCUTANEOUS PRN
Status: DISCONTINUED | OUTPATIENT
Start: 2024-02-21 | End: 2024-02-21 | Stop reason: ALTCHOICE

## 2024-02-21 RX ORDER — SODIUM CHLORIDE 0.9 % (FLUSH) 0.9 %
5-40 SYRINGE (ML) INJECTION PRN
Status: ACTIVE | OUTPATIENT
Start: 2024-02-21 | End: 2024-02-22

## 2024-02-21 RX ORDER — IBUPROFEN 600 MG/1
1 TABLET ORAL PRN
Status: DISCONTINUED | OUTPATIENT
Start: 2024-02-21 | End: 2024-02-21 | Stop reason: HOSPADM

## 2024-02-21 RX ORDER — OXYCODONE HYDROCHLORIDE 5 MG/1
5 TABLET ORAL
Status: COMPLETED | OUTPATIENT
Start: 2024-02-21 | End: 2024-02-21

## 2024-02-21 RX ORDER — SODIUM CHLORIDE 9 MG/ML
INJECTION, SOLUTION INTRAVENOUS PRN
Status: DISCONTINUED | OUTPATIENT
Start: 2024-02-21 | End: 2024-02-21 | Stop reason: HOSPADM

## 2024-02-21 RX ORDER — SCOLOPAMINE TRANSDERMAL SYSTEM 1 MG/1
1 PATCH, EXTENDED RELEASE TRANSDERMAL ONCE
Status: DISCONTINUED | OUTPATIENT
Start: 2024-02-21 | End: 2024-02-22 | Stop reason: HOSPADM

## 2024-02-21 RX ORDER — DIPHENHYDRAMINE HCL 25 MG
25 CAPSULE ORAL EVERY 6 HOURS PRN
Status: DISCONTINUED | OUTPATIENT
Start: 2024-02-21 | End: 2024-02-22 | Stop reason: HOSPADM

## 2024-02-21 RX ORDER — DIPHENHYDRAMINE HYDROCHLORIDE 50 MG/ML
12.5 INJECTION INTRAMUSCULAR; INTRAVENOUS
Status: DISCONTINUED | OUTPATIENT
Start: 2024-02-21 | End: 2024-02-21 | Stop reason: HOSPADM

## 2024-02-21 RX ORDER — FENTANYL CITRATE 50 UG/ML
INJECTION, SOLUTION INTRAMUSCULAR; INTRAVENOUS
Status: COMPLETED
Start: 2024-02-21 | End: 2024-02-21

## 2024-02-21 RX ORDER — ROPIVACAINE HYDROCHLORIDE 5 MG/ML
INJECTION, SOLUTION EPIDURAL; INFILTRATION; PERINEURAL PRN
Status: DISCONTINUED | OUTPATIENT
Start: 2024-02-21 | End: 2024-02-21 | Stop reason: ALTCHOICE

## 2024-02-21 RX ORDER — PROCHLORPERAZINE EDISYLATE 5 MG/ML
5 INJECTION INTRAMUSCULAR; INTRAVENOUS
Status: DISCONTINUED | OUTPATIENT
Start: 2024-02-21 | End: 2024-02-21 | Stop reason: HOSPADM

## 2024-02-21 RX ORDER — SODIUM CHLORIDE 9 MG/ML
INJECTION, SOLUTION INTRAVENOUS PRN
Status: ACTIVE | OUTPATIENT
Start: 2024-02-21 | End: 2024-02-22

## 2024-02-21 RX ORDER — EPHEDRINE SULFATE/0.9% NACL/PF 50 MG/5 ML
SYRINGE (ML) INTRAVENOUS PRN
Status: DISCONTINUED | OUTPATIENT
Start: 2024-02-21 | End: 2024-02-21 | Stop reason: SDUPTHER

## 2024-02-21 RX ORDER — SODIUM CHLORIDE, SODIUM LACTATE, POTASSIUM CHLORIDE, CALCIUM CHLORIDE 600; 310; 30; 20 MG/100ML; MG/100ML; MG/100ML; MG/100ML
INJECTION, SOLUTION INTRAVENOUS CONTINUOUS
Status: DISCONTINUED | OUTPATIENT
Start: 2024-02-21 | End: 2024-02-22 | Stop reason: HOSPADM

## 2024-02-21 RX ORDER — ROPIVACAINE HYDROCHLORIDE 5 MG/ML
INJECTION, SOLUTION EPIDURAL; INFILTRATION; PERINEURAL
Status: COMPLETED | OUTPATIENT
Start: 2024-02-21 | End: 2024-02-21

## 2024-02-21 RX ORDER — ACETAMINOPHEN 500 MG
1000 TABLET ORAL EVERY 8 HOURS PRN
Status: DISCONTINUED | OUTPATIENT
Start: 2024-02-21 | End: 2024-02-22 | Stop reason: HOSPADM

## 2024-02-21 RX ORDER — SODIUM CHLORIDE 9 MG/ML
INJECTION, SOLUTION INTRAVENOUS CONTINUOUS
Status: DISCONTINUED | OUTPATIENT
Start: 2024-02-21 | End: 2024-02-21 | Stop reason: HOSPADM

## 2024-02-21 RX ORDER — SODIUM CHLORIDE 0.9 % (FLUSH) 0.9 %
5-40 SYRINGE (ML) INJECTION EVERY 12 HOURS SCHEDULED
Status: DISCONTINUED | OUTPATIENT
Start: 2024-02-21 | End: 2024-02-21 | Stop reason: HOSPADM

## 2024-02-21 RX ORDER — HYDROMORPHONE HYDROCHLORIDE 2 MG/ML
INJECTION, SOLUTION INTRAMUSCULAR; INTRAVENOUS; SUBCUTANEOUS PRN
Status: DISCONTINUED | OUTPATIENT
Start: 2024-02-21 | End: 2024-02-21 | Stop reason: SDUPTHER

## 2024-02-21 RX ORDER — HYDROMORPHONE HYDROCHLORIDE 2 MG/ML
0.5 INJECTION, SOLUTION INTRAMUSCULAR; INTRAVENOUS; SUBCUTANEOUS EVERY 4 HOURS PRN
Status: DISCONTINUED | OUTPATIENT
Start: 2024-02-21 | End: 2024-02-22 | Stop reason: HOSPADM

## 2024-02-21 RX ORDER — MIDAZOLAM HYDROCHLORIDE 2 MG/2ML
2 INJECTION, SOLUTION INTRAMUSCULAR; INTRAVENOUS
Status: COMPLETED | OUTPATIENT
Start: 2024-02-21 | End: 2024-02-21

## 2024-02-21 RX ORDER — PROPOFOL 10 MG/ML
INJECTION, EMULSION INTRAVENOUS PRN
Status: DISCONTINUED | OUTPATIENT
Start: 2024-02-21 | End: 2024-02-21 | Stop reason: SDUPTHER

## 2024-02-21 RX ORDER — LIDOCAINE HYDROCHLORIDE 10 MG/ML
1 INJECTION, SOLUTION INFILTRATION; PERINEURAL
Status: DISCONTINUED | OUTPATIENT
Start: 2024-02-21 | End: 2024-02-21 | Stop reason: HOSPADM

## 2024-02-21 RX ORDER — ACETAMINOPHEN 500 MG
1000 TABLET ORAL ONCE
Status: COMPLETED | OUTPATIENT
Start: 2024-02-21 | End: 2024-02-21

## 2024-02-21 RX ORDER — DEXAMETHASONE SODIUM PHOSPHATE 10 MG/ML
INJECTION, SOLUTION INTRAMUSCULAR; INTRAVENOUS
Status: COMPLETED | OUTPATIENT
Start: 2024-02-21 | End: 2024-02-21

## 2024-02-21 RX ORDER — FENTANYL CITRATE 50 UG/ML
100 INJECTION, SOLUTION INTRAMUSCULAR; INTRAVENOUS ONCE
Status: DISCONTINUED | OUTPATIENT
Start: 2024-02-21 | End: 2024-02-21 | Stop reason: SDUPTHER

## 2024-02-21 RX ORDER — HYDROMORPHONE HYDROCHLORIDE 2 MG/ML
0.5 INJECTION, SOLUTION INTRAMUSCULAR; INTRAVENOUS; SUBCUTANEOUS EVERY 5 MIN PRN
Status: DISCONTINUED | OUTPATIENT
Start: 2024-02-21 | End: 2024-02-21 | Stop reason: HOSPADM

## 2024-02-21 RX ORDER — EPINEPHRINE 1 MG/ML(1)
AMPUL (ML) INJECTION PRN
Status: DISCONTINUED | OUTPATIENT
Start: 2024-02-21 | End: 2024-02-21 | Stop reason: ALTCHOICE

## 2024-02-21 RX ORDER — TRANEXAMIC ACID 100 MG/ML
INJECTION, SOLUTION INTRAVENOUS PRN
Status: DISCONTINUED | OUTPATIENT
Start: 2024-02-21 | End: 2024-02-21 | Stop reason: SDUPTHER

## 2024-02-21 RX ORDER — OXYCODONE HYDROCHLORIDE 5 MG/1
5 TABLET ORAL EVERY 4 HOURS PRN
Status: DISCONTINUED | OUTPATIENT
Start: 2024-02-21 | End: 2024-02-22 | Stop reason: HOSPADM

## 2024-02-21 RX ORDER — ONDANSETRON 2 MG/ML
INJECTION INTRAMUSCULAR; INTRAVENOUS PRN
Status: DISCONTINUED | OUTPATIENT
Start: 2024-02-21 | End: 2024-02-21 | Stop reason: SDUPTHER

## 2024-02-21 RX ORDER — SODIUM CHLORIDE, SODIUM LACTATE, POTASSIUM CHLORIDE, CALCIUM CHLORIDE 600; 310; 30; 20 MG/100ML; MG/100ML; MG/100ML; MG/100ML
INJECTION, SOLUTION INTRAVENOUS CONTINUOUS
Status: DISCONTINUED | OUTPATIENT
Start: 2024-02-21 | End: 2024-02-21 | Stop reason: HOSPADM

## 2024-02-21 RX ORDER — ASPIRIN 81 MG/1
81 TABLET ORAL 2 TIMES DAILY
Status: DISCONTINUED | OUTPATIENT
Start: 2024-02-21 | End: 2024-02-22 | Stop reason: HOSPADM

## 2024-02-21 RX ADMIN — KETAMINE HYDROCHLORIDE 20 MG: 50 INJECTION, SOLUTION INTRAMUSCULAR; INTRAVENOUS at 14:00

## 2024-02-21 RX ADMIN — HYDROMORPHONE HYDROCHLORIDE 0.5 MG: 2 INJECTION INTRAMUSCULAR; INTRAVENOUS; SUBCUTANEOUS at 14:33

## 2024-02-21 RX ADMIN — KETOROLAC TROMETHAMINE 30 MG: 30 INJECTION, SOLUTION INTRAMUSCULAR; INTRAVENOUS at 19:45

## 2024-02-21 RX ADMIN — MIDAZOLAM HYDROCHLORIDE 2 MG: 1 INJECTION, SOLUTION INTRAMUSCULAR; INTRAVENOUS at 13:00

## 2024-02-21 RX ADMIN — OXYCODONE HYDROCHLORIDE 5 MG: 5 TABLET ORAL at 17:46

## 2024-02-21 RX ADMIN — ACETAMINOPHEN 1000 MG: 500 TABLET ORAL at 12:55

## 2024-02-21 RX ADMIN — DIPHENHYDRAMINE HYDROCHLORIDE 25 MG: 25 CAPSULE ORAL at 23:41

## 2024-02-21 RX ADMIN — DEXAMETHASONE SODIUM PHOSPHATE 4 MG: 10 INJECTION, SOLUTION INTRAMUSCULAR; INTRAVENOUS at 13:00

## 2024-02-21 RX ADMIN — Medication 10 MG: at 14:15

## 2024-02-21 RX ADMIN — PROPOFOL 50 MG: 10 INJECTION, EMULSION INTRAVENOUS at 13:56

## 2024-02-21 RX ADMIN — LIDOCAINE HYDROCHLORIDE 20 MG: 20 INJECTION, SOLUTION EPIDURAL; INFILTRATION; INTRACAUDAL; PERINEURAL at 13:51

## 2024-02-21 RX ADMIN — DEXAMETHASONE SODIUM PHOSPHATE 10 MG: 10 INJECTION INTRAMUSCULAR; INTRAVENOUS at 14:15

## 2024-02-21 RX ADMIN — TRANEXAMIC ACID 1000 MG: 100 INJECTION, SOLUTION INTRAVENOUS at 14:00

## 2024-02-21 RX ADMIN — KETAMINE HYDROCHLORIDE 20 MG: 50 INJECTION, SOLUTION INTRAMUSCULAR; INTRAVENOUS at 15:00

## 2024-02-21 RX ADMIN — ONDANSETRON 4 MG: 2 INJECTION INTRAMUSCULAR; INTRAVENOUS at 14:15

## 2024-02-21 RX ADMIN — PHENYLEPHRINE HYDROCHLORIDE 100 MCG: 10 INJECTION INTRAVENOUS at 13:58

## 2024-02-21 RX ADMIN — FENTANYL CITRATE 100 MCG: 0.05 INJECTION, SOLUTION INTRAMUSCULAR; INTRAVENOUS at 13:00

## 2024-02-21 RX ADMIN — HYDROMORPHONE HYDROCHLORIDE 0.5 MG: 2 INJECTION INTRAMUSCULAR; INTRAVENOUS; SUBCUTANEOUS at 14:41

## 2024-02-21 RX ADMIN — SODIUM CHLORIDE, SODIUM LACTATE, POTASSIUM CHLORIDE, AND CALCIUM CHLORIDE: 600; 310; 30; 20 INJECTION, SOLUTION INTRAVENOUS at 14:33

## 2024-02-21 RX ADMIN — PROPOFOL 300 MG: 10 INJECTION, EMULSION INTRAVENOUS at 13:51

## 2024-02-21 RX ADMIN — ASPIRIN 81 MG: 81 TABLET, COATED ORAL at 19:45

## 2024-02-21 RX ADMIN — HYDROMORPHONE HYDROCHLORIDE 0.5 MG: 2 INJECTION INTRAMUSCULAR; INTRAVENOUS; SUBCUTANEOUS at 15:02

## 2024-02-21 RX ADMIN — OXYCODONE HYDROCHLORIDE 5 MG: 5 TABLET ORAL at 22:33

## 2024-02-21 RX ADMIN — HYDROMORPHONE HYDROCHLORIDE 0.5 MG: 2 INJECTION INTRAMUSCULAR; INTRAVENOUS; SUBCUTANEOUS at 14:21

## 2024-02-21 RX ADMIN — ROPIVACAINE HYDROCHLORIDE 30 ML: 5 INJECTION, SOLUTION EPIDURAL; INFILTRATION; PERINEURAL at 13:00

## 2024-02-21 RX ADMIN — PHENYLEPHRINE HYDROCHLORIDE 200 MCG: 10 INJECTION INTRAVENOUS at 14:09

## 2024-02-21 RX ADMIN — Medication 2 G: at 14:00

## 2024-02-21 RX ADMIN — PHENYLEPHRINE HYDROCHLORIDE 200 MCG: 10 INJECTION INTRAVENOUS at 16:46

## 2024-02-21 ASSESSMENT — PAIN SCALES - GENERAL
PAINLEVEL_OUTOF10: 7
PAINLEVEL_OUTOF10: 8
PAINLEVEL_OUTOF10: 0

## 2024-02-21 ASSESSMENT — PAIN DESCRIPTION - LOCATION
LOCATION: LEG
LOCATION: LEG

## 2024-02-21 ASSESSMENT — PAIN DESCRIPTION - DESCRIPTORS: DESCRIPTORS: ACHING

## 2024-02-21 ASSESSMENT — PAIN DESCRIPTION - ORIENTATION
ORIENTATION: RIGHT
ORIENTATION: RIGHT

## 2024-02-21 ASSESSMENT — ENCOUNTER SYMPTOMS: SHORTNESS OF BREATH: 1

## 2024-02-21 ASSESSMENT — PAIN - FUNCTIONAL ASSESSMENT
PAIN_FUNCTIONAL_ASSESSMENT: 0-10
PAIN_FUNCTIONAL_ASSESSMENT: PREVENTS OR INTERFERES SOME ACTIVE ACTIVITIES AND ADLS

## 2024-02-21 ASSESSMENT — LIFESTYLE VARIABLES: SMOKING_STATUS: 1

## 2024-02-21 ASSESSMENT — PAIN DESCRIPTION - ONSET: ONSET: GRADUAL

## 2024-02-21 ASSESSMENT — PAIN DESCRIPTION - PAIN TYPE: TYPE: SURGICAL PAIN

## 2024-02-21 ASSESSMENT — PAIN DESCRIPTION - FREQUENCY: FREQUENCY: INTERMITTENT

## 2024-02-21 NOTE — ANESTHESIA POSTPROCEDURE EVALUATION
Department of Anesthesiology  Postprocedure Note    Patient: Cyndi Fay  MRN: [id number]  YOB: 1985  Date of evaluation: 2/21/2024    Procedure Summary     Date: 02/21/24 Room / Location: D OP OR 03 / [facility] OPC    Anesthesia Start: 1342 Anesthesia Stop: 1738    Procedure: RIGHT KNEE ARTHROSCOPY REVISION ACL RECONSTRUCTION AND MEDIAL MENISCUS REPAIR    SUPINE (Right: Knee) Diagnosis:       Acute medial meniscus tear of right knee, subsequent encounter      Rupture of anterior cruciate ligament of right knee, initial encounter      Acute pain of right knee      Primary osteoarthritis of right knee      Pes anserine bursitis      (Acute medial meniscus tear of right knee, subsequent encounter [S83.241D])      (Rupture of anterior cruciate ligament of right knee, initial encounter [S83.511A])      (Acute pain of right knee [M25.561])      (Primary osteoarthritis of right knee [M17.11])      (Pes anserine bursitis [M70.50])    Surgeons: Wilmer Monterroso MD Responsible Provider: Nancy Conteh MD    Anesthesia Type: General ASA Status: 2          Anesthesia Type: General    Mackenzie Phase I: Mackenzie Score: 7    Mackenzie Phase II:      Anesthesia Post Evaluation    Patient location during evaluation: PACU  Patient participation: complete - patient participated  Level of consciousness: awake and alert  Airway patency: patent  Nausea: well controlled.  Cardiovascular status: acceptable.  Respiratory status: acceptable  Hydration status: stable  Pain management: adequate        No notable events documented.

## 2024-02-21 NOTE — ANESTHESIA PROCEDURE NOTES
Airway  Date/Time: 2/21/2024 1:52 PM  Urgency: elective    Airway not difficult    General Information and Staff    Patient location during procedure: OR  Anesthesiologist: Nancy Conteh MD  Resident/CRNA: Irma Brooke APRN - CRNA  Performed: resident/CRNA/CAA   Performed by: Irma Brooke APRN - CRNA  Authorized by: Nancy Conteh MD      Indications and Patient Condition  Indications for airway management: anesthesia  Spontaneous ventilation: present  Sedation level: deep  Preoxygenated: yes  Patient position: sniffing  MILS not maintained throughout  Mask difficulty assessment: vent by bag mask    Final Airway Details  Final airway type: supraglottic airway      Successful airway: oropharyngeal  Size 4  Cuff Pressure (cm H2O): 40     Number of attempts at approach: 1  Ventilation between attempts: bag mask  Number of other approaches attempted: 0    no

## 2024-02-21 NOTE — PERIOP NOTE
TRANSFER - OUT REPORT:    Verbal report given to [place] on Cyndi Fay  being transferred to [facility] for routine post-op       Report consisted of patient’s Situation, Background, Assessment and   Recommendations(SBAR).     Information from the following report(s) Nurse Handoff Report, Adult Overview, Surgery Report, and Event Log was reviewed with the receiving nurse.    Lines:   Peripheral IV 02/21/24 Left Antecubital (Active)   Site Assessment Clean, dry & intact 02/21/24 1734   Line Status Infusing 02/21/24 1734   Phlebitis Assessment No symptoms 02/21/24 1734   Infiltration Assessment 0 02/21/24 1734   Alcohol Cap Used No 02/21/24 1734   Dressing Status Clean, dry & intact 02/21/24 1734   Dressing Type Transparent 02/21/24 1734        Opportunity for questions and clarification was provided.      VTE prophylaxis orders have been written for Cyndi Fay.    Patient and family given floor number and nurses name.  Family updated re: pt status after security code verified.

## 2024-02-21 NOTE — PROGRESS NOTES
TRANSFER - IN REPORT:    Verbal report received from ALBER Victoria on Cyndi Fay  being received from PACU for routine progression of patient care      Report consisted of patient's Situation, Background, Assessment and   Recommendations(SBAR).     Information from the following report(s) Nurse Handoff Report was reviewed with the receiving nurse.    Opportunity for questions and clarification was provided.      Assessment will be completed upon patient's arrival to unit and care assumed.

## 2024-02-21 NOTE — ANESTHESIA PROCEDURE NOTES
Peripheral Block    Patient location during procedure: pre-op  Reason for block: post-op pain management and at surgeon's request  Start time: 2/21/2024 1:00 PM  End time: 2/21/2024 1:04 PM  Staffing  Performed: anesthesiologist   Anesthesiologist: Nancy Conteh MD  Performed by: Nancy Conteh MD  Authorized by: Nancy Conteh MD    Preanesthetic Checklist  Completed: patient identified, IV checked, site marked, risks and benefits discussed, surgical/procedural consents, equipment checked, pre-op evaluation, timeout performed, anesthesia consent given, oxygen available and monitors applied/VS acknowledged  Peripheral Block   Patient position: supine  Prep: ChloraPrep  Provider prep: sterile gloves and mask  Patient monitoring: cardiac monitor, continuous pulse ox, frequent blood pressure checks, IV access, oxygen and responsive to questions  Block type: Femoral  Adductor canal  Laterality: right  Injection technique: single-shot  Guidance: ultrasound guided    Needle   Needle type: insulated echogenic nerve stimulator needle   Needle gauge: 20 G  Needle localization: ultrasound guidance  Needle length: 10 cm  Assessment   Injection assessment: negative aspiration for heme, no paresthesia on injection, local visualized surrounding nerve on ultrasound and no intravascular symptoms  Paresthesia pain: none  Slow fractionated injection: yes  Hemodynamics: stable  Real-time US image taken/store: yes  Outcomes: uncomplicated and patient tolerated procedure well    Additional Notes  Ultrasound image taken and stored in chart   Medications Administered  dexAMETHasone (DECADRON) (PF) 10 mg/mL injection - Other   4 mg - 2/21/2024 1:00:00 PM  ropivacaine (NAROPIN) injection 0.5% - Perineural   30 mL - 2/21/2024 1:00:00 PM

## 2024-02-21 NOTE — INTERVAL H&P NOTE
Update History & Physical    The Patient's History and Physical was reviewed   I discussed the surgery and patients medical condition with the patient.  The chart was reviewed with the patient and I examined the patient.    There was no change from previous note.  The surgical site was confirmed by the patient and me.    CV: RRR  RESP: CTAB    Plan:  The risk, benefits, expected outcome, and alternative to the recommended procedure have been discussed with the patient.  Patient understands and elects to proceed with the procedure as planned.    Electronically signed by Wilmer Monterroso MD on 02/21/24 12:40 PM

## 2024-02-21 NOTE — FLOWSHEET NOTE
02/21/24 1645   Family Communication    Relationship to Patient Parent    Phone Number [phone]  (JOHANNE)   Family/Significant Other Update Called   Delivery Origin Nurse   Message Disposition Family present - message delivered   Update Given Yes   Family Communication   Family Update Message Surgeon working;Patient stable

## 2024-02-22 VITALS
OXYGEN SATURATION: 100 % | HEART RATE: 64 BPM | TEMPERATURE: 98.6 F | HEIGHT: 67 IN | DIASTOLIC BLOOD PRESSURE: 90 MMHG | WEIGHT: 150 LBS | SYSTOLIC BLOOD PRESSURE: 145 MMHG | BODY MASS INDEX: 23.54 KG/M2 | RESPIRATION RATE: 16 BRPM

## 2024-02-22 DIAGNOSIS — S83.511A RUPTURE OF ANTERIOR CRUCIATE LIGAMENT OF RIGHT KNEE, INITIAL ENCOUNTER: Primary | ICD-10-CM

## 2024-02-22 PROCEDURE — 6360000002 HC RX W HCPCS: Performed by: PHYSICIAN ASSISTANT

## 2024-02-22 PROCEDURE — 96374 THER/PROPH/DIAG INJ IV PUSH: CPT

## 2024-02-22 PROCEDURE — 97530 THERAPEUTIC ACTIVITIES: CPT

## 2024-02-22 PROCEDURE — 6370000000 HC RX 637 (ALT 250 FOR IP): Performed by: PHYSICIAN ASSISTANT

## 2024-02-22 PROCEDURE — 96376 TX/PRO/DX INJ SAME DRUG ADON: CPT

## 2024-02-22 PROCEDURE — 97161 PT EVAL LOW COMPLEX 20 MIN: CPT

## 2024-02-22 RX ORDER — KETOROLAC TROMETHAMINE 10 MG/1
10 TABLET, FILM COATED ORAL EVERY 6 HOURS PRN
Qty: 20 TABLET | Refills: 0 | Status: SHIPPED | OUTPATIENT
Start: 2024-02-22 | End: 2025-02-21

## 2024-02-22 RX ADMIN — KETOROLAC TROMETHAMINE 30 MG: 30 INJECTION, SOLUTION INTRAMUSCULAR; INTRAVENOUS at 02:36

## 2024-02-22 RX ADMIN — ASPIRIN 81 MG: 81 TABLET, COATED ORAL at 08:10

## 2024-02-22 RX ADMIN — DOCUSATE SODIUM 100 MG: 100 CAPSULE, LIQUID FILLED ORAL at 08:10

## 2024-02-22 RX ADMIN — OXYCODONE HYDROCHLORIDE 5 MG: 5 TABLET ORAL at 04:39

## 2024-02-22 RX ADMIN — KETOROLAC TROMETHAMINE 30 MG: 30 INJECTION, SOLUTION INTRAMUSCULAR; INTRAVENOUS at 08:09

## 2024-02-22 ASSESSMENT — PAIN SCALES - GENERAL
PAINLEVEL_OUTOF10: 0
PAINLEVEL_OUTOF10: 7

## 2024-02-22 ASSESSMENT — PAIN DESCRIPTION - ONSET: ONSET: GRADUAL

## 2024-02-22 ASSESSMENT — PAIN DESCRIPTION - DESCRIPTORS: DESCRIPTORS: ACHING

## 2024-02-22 ASSESSMENT — PAIN - FUNCTIONAL ASSESSMENT: PAIN_FUNCTIONAL_ASSESSMENT: PREVENTS OR INTERFERES SOME ACTIVE ACTIVITIES AND ADLS

## 2024-02-22 ASSESSMENT — PAIN DESCRIPTION - LOCATION: LOCATION: LEG

## 2024-02-22 ASSESSMENT — PAIN DESCRIPTION - ORIENTATION: ORIENTATION: RIGHT

## 2024-02-22 ASSESSMENT — PAIN DESCRIPTION - FREQUENCY: FREQUENCY: INTERMITTENT

## 2024-02-22 ASSESSMENT — PAIN DESCRIPTION - PAIN TYPE: TYPE: SURGICAL PAIN

## 2024-02-22 NOTE — PLAN OF CARE
Problem: Pain  Goal: Verbalizes/displays adequate comfort level or baseline comfort level  Outcome: Progressing     Problem: Safety - Adult  Goal: Free from fall injury  Outcome: Progressing     Problem: Discharge Planning  Goal: Discharge to home or other facility with appropriate resources  Outcome: Progressing  Flowsheets (Taken 2/21/2024 4783 by Awa Chavez, RN)  Discharge to home or other facility with appropriate resources: Identify barriers to discharge with patient and caregiver     Problem: ABCDS Injury Assessment  Goal: Absence of physical injury  Outcome: Progressing

## 2024-02-22 NOTE — CARE COORDINATION
Patient to be discharged home today. CM spoke with patient at bedside. Demographics verified. Patient lives alone. She is independent at baseline. She has crutches in the room. Office has arranged outpatient PT to start tomorrow. Patient has no PCP and is agreeable [place] [facility] [place] one. Referral placed. CM will remain available through discharge.     02/22/24 0942   Service Assessment   Patient Orientation Alert and Oriented   Cognition Alert   History Provided By Patient   Primary Caregiver Self   Support Systems Family Members   PCP Verified by CM No  ([facility] [place]   Prior Functional Level Independent in ADLs/IADLs   Current Functional Level Assistance with the following:   Can patient return to prior living arrangement Yes   Ability to make needs known: Good   Family able to assist with home care needs: Yes   Would you like for me to discuss the discharge plan with any other family members/significant others, and if so, who? No   Services At/After Discharge   Transition of Care Consult (CM Consult) N/A   Services At/After Discharge PT   Mode of Transport at Discharge Self   Confirm Follow Up Transport Self   Condition of Participation: Discharge Planning   The Plan for Transition of Care is related to the following treatment goals: Discharge home with outpatient therapy.       
36.4

## 2024-02-22 NOTE — THERAPY EVALUATION
ACUTE PHYSICAL THERAPY GOALS:   (Developed with and agreed upon by patient and/or caregiver.)  TDWB (25%) RLE, knee brace locked in extension for WB, allowed 0-90 degrees when seated  (1.) Cyndi Fay  will move from supine to sit and sit to supine , scoot up and down, and roll side to side with MODIFIED INDEPENDENCE within 7 treatment day(s).    (2.) Cyndi Fay will transfer from bed to chair and chair to bed with MODIFIED INDEPENDENCE using the least restrictive device within 7 treatment day(s).    (3.) Cyndi Fay will ambulate with MODIFIED INDEPENDENCE for 250 feet with the least restrictive device within 7 treatment day(s).   (4.) Cyndi Fay will perform standing static and dynamic balance activities x 15 minutes with MODIFIED INDEPENDENCE to improve safety within 7 treatment day(s).  (5.) Cyndi Fay will ascend and descend 15 stairs using bilateral hand rail(s) with MODIFIED INDEPENDENCE to improve functional mobility and safety within 7 treatment day(s).  (6.) Cyndi Fay will perform therapeutic exercises x 20 min for HEP with INDEPENDENCE to improve strength, endurance, and functional mobility within 7 treatment day(s).     PHYSICAL THERAPY Initial Assessment, Daily Note, and AM  (Link to Caseload Tracking: PT Visit Days : 1  Acknowledge Orders  Time In/Out  PT Charge Capture  Rehab Caseload Tracker    Cyndi Fay is a 38 y.o. female   PRIMARY DIAGNOSIS: S/P right knee arthroscopy  Acute medial meniscus tear of right knee, subsequent encounter [S83.241D]  Rupture of anterior cruciate ligament of right knee, initial encounter [S83.511A]  Acute pain of right knee [M25.561]  Primary osteoarthritis of right knee [M17.11]  Pes anserine bursitis [M70.50]  S/P right knee arthroscopy [Z98.890]  Status post orthopedic surgery, follow-up exam [Z09]  Procedure(s) (LRB):  RIGHT KNEE ARTHROSCOPY REVISION ACL RECONSTRUCTION AND MEDIAL

## 2024-02-22 NOTE — PROGRESS NOTES
I have reviewed discharge instructions with the patient.  The patient verbalized understanding.  RN removed IV without complications.    Left message on voicemail with results. Asked patient to call back with any questions.

## 2024-02-22 NOTE — PLAN OF CARE
Problem: Pain  Goal: Verbalizes/displays adequate comfort level or baseline comfort level  2/22/2024 0814 by Fariha Price, RN  Outcome: Progressing  Flowsheets (Taken 2/22/2024 0810)  Verbalizes/displays adequate comfort level or baseline comfort level: Encourage patient to monitor pain and request assistance  2/21/2024 2348 by Nayely Allred, RN  Outcome: Progressing     Problem: Safety - Adult  Goal: Free from fall injury  2/22/2024 0814 by Fariha Price, RN  Outcome: Progressing  2/21/2024 2348 by Nayely Allred, RN  Outcome: Progressing     Problem: Discharge Planning  Goal: Discharge to home or other facility with appropriate resources  2/22/2024 0814 by Fariha Price, RN  Outcome: Progressing  2/21/2024 2348 by Nayely Allred, RN  Outcome: Progressing  Flowsheets (Taken 2/21/2024 1829 by Awa Chavez, RN)  Discharge to home or other facility with appropriate resources: Identify barriers to discharge with patient and caregiver     Problem: ABCDS Injury Assessment  Goal: Absence of physical injury  2/22/2024 0814 by Fariha Price, RN  Outcome: Progressing  2/21/2024 2348 by Nayely Allred, RN  Outcome: Progressing

## 2024-02-22 NOTE — PLAN OF CARE
Problem: Pain  Goal: Verbalizes/displays adequate comfort level or baseline comfort level  2/22/2024 1308 by Fariha Price RN  Outcome: Completed  2/22/2024 0814 by Fariha Price RN  Outcome: Progressing  Flowsheets (Taken 2/22/2024 0810)  Verbalizes/displays adequate comfort level or baseline comfort level: Encourage patient to monitor pain and request assistance  2/21/2024 2348 by Nayely Allred, RN  Outcome: Progressing     Problem: Safety - Adult  Goal: Free from fall injury  2/22/2024 1308 by Fariha Price RN  Outcome: Completed  2/22/2024 0814 by Fariha Price RN  Outcome: Progressing  Flowsheets (Taken 2/22/2024 0810)  Free From Fall Injury: Instruct family/caregiver on patient safety  2/21/2024 2348 by Nayely Allred, RN  Outcome: Progressing     Problem: Discharge Planning  Goal: Discharge to home or other facility with appropriate resources  2/22/2024 1308 by Fariha Price RN  Outcome: Completed  2/22/2024 0814 by Fariha Price RN  Outcome: Progressing  Flowsheets (Taken 2/22/2024 0810)  Discharge to home or other facility with appropriate resources: Identify barriers to discharge with patient and caregiver  2/21/2024 2348 by Nayely Allred RN  Outcome: Progressing  Flowsheets (Taken 2/21/2024 1829 by Awa Chavez, RN)  Discharge to home or other facility with appropriate resources: Identify barriers to discharge with patient and caregiver     Problem: ABCDS Injury Assessment  Goal: Absence of physical injury  2/22/2024 1308 by Fariha Price, RN  Outcome: Completed  2/22/2024 0814 by Fariha Price RN  Outcome: Progressing  Flowsheets (Taken 2/22/2024 0810)  Absence of Physical Injury: Implement safety measures based on patient assessment  2/21/2024 2348 by Nayely Allred, RN  Outcome: Progressing

## 2024-02-22 NOTE — DISCHARGE INSTRUCTIONS
Post-op instructions for ACL Reconstruction / Meniscus Repair / Patellar Stabilization (Dr. Monterroso)    Day of Surgery:     DIET:   Begin with liquids and light foods (jell-o, soup, etc.). Progress to your normal diet if you are not nauseated.    MEDICATION:   1. Pain- Norco (hydrocodone/acetaminophen) or Oxycodone. If you are taking oxycodone, you may also take two (2) Tylenol (acetaminophen) 500mg tabs every eight (8) hours. Do not take Tylenol (acetaminophen) if you are given Norco as this medicine already contains acetaminophen. Do not drink alcohol while taking pain medication. Slowly wean off the pain medication as the pain improves.   2. Aspirin 81mg-Take one (1) tablet in the morning and at night each day x 2 weeks post-operatively. Begin the night of surgery.   3. Stool Softener-Pain medication can cause constipation. Be sure to drink plenty of water and take an over the counter stool softener as needed.     ICE:  Do NOT put the ice machine directly on your skin. Use it frequently for the first 72 hours. You may also use a regular ice bag/pack for 20 minutes at a time. Place a thin layer of clothing or pillow case between ice pack and your skin. Ice for 20-30 minutes on and then 20-30 minutes off.   If you are awake and comfortable or you have the surgical dressing still intact it is ok to use the ice machine more than 30 minutes at a time as long as you ensure it is not burning your skin.       SHOWERING:  No showering. Leave bandages in place.     ACTIVITY:  Keep leg elevated on a pillow placed under your ankle.   **DO NOT PUT A PILLOW UNDER YOUR KNEE!!**  It is important for you to keep your leg straight. Use crutches and put no weight on the operative leg. If you were given a brace, keep it on.    EXERCISES:  Begin ankle pumps.   Attempt quadriceps sets and straight leg raises (with brace on).        First & Second Post-Op Day:    MEDICATION: Continue as instructed as above.    BANDAGES: No

## 2024-02-22 NOTE — DISCHARGE SUMMARY
soln infusion   IntraVENous Continuous    scopolamine (TRANSDERM-SCOP) transdermal patch 1 patch  1 patch TransDERmal Once    oxyCODONE (ROXICODONE) immediate release tablet 5 mg  5 mg Oral Q4H PRN    ketorolac (TORADOL) injection 30 mg  30 mg IntraVENous Q6H    acetaminophen (TYLENOL) tablet 1,000 mg  1,000 mg Oral Q8H PRN    diphenhydrAMINE (BENADRYL) capsule 25 mg  25 mg Oral Q6H PRN    docusate sodium (COLACE) capsule 100 mg  100 mg Oral Daily    ondansetron (ZOFRAN-ODT) disintegrating tablet 4 mg  4 mg Oral Q8H PRN    HYDROmorphone HCl PF (DILAUDID) injection 0.5 mg  0.5 mg IntraVENous Q4H PRN    sodium chloride flush 0.9 % injection 5-40 mL  5-40 mL IntraVENous PRN    sodium chloride flush 0.9 % injection 5-40 mL  5-40 mL IntraVENous PRN    0.9 % sodium chloride infusion   IntraVENous PRN    aspirin EC tablet 81 mg  81 mg Oral BID         Additional DVT Prophylaxis:  AURE Hose, SCDs     Postoperative Blood Report: If the patient received blood products during their admission they are listed below:     No components found for: \"PCTEXX\"  No components found for: \"PCTABR\"  No results found for: \"ABORH\"  No components found for: \"PCTUN\"  No components found for: \"PCTCT\"  No components found for: \"PCTUDIV\"  No components found for: \"PCTXM\"    Post Op complications: none       Physical Therapy: PT was started on the day of surgery and progressed. She is safely able to get in and out of house, onto and off the toilet, and get around with restrictions at this time.     Disposition: Good    Upon Discharge:The wound appears to be healing without any evidence of infection.      Pt Discharged to: .    Discharge Medications:      Medication List        START taking these medications      ketorolac 10 MG tablet  Commonly known as: TORADOL  Take 1 tablet by mouth every 6 hours as needed for Pain            CONTINUE taking these medications      medroxyPROGESTERone 150 MG/ML injection  Commonly known as: DEPO-PROVERA  Inject

## 2024-02-23 ENCOUNTER — EVALUATION (OUTPATIENT)
Age: 39
End: 2024-02-23
Payer: COMMERCIAL

## 2024-02-23 ENCOUNTER — CLINICAL DOCUMENTATION (OUTPATIENT)
Dept: ORTHOPEDIC SURGERY | Age: 39
End: 2024-02-23

## 2024-02-23 DIAGNOSIS — M25.661 DECREASED RANGE OF MOTION OF RIGHT KNEE: ICD-10-CM

## 2024-02-23 DIAGNOSIS — Z78.9 IMPAIRED MOTOR CONTROL: ICD-10-CM

## 2024-02-23 DIAGNOSIS — R29.898 WEAKNESS OF BOTH LOWER EXTREMITIES: ICD-10-CM

## 2024-02-23 DIAGNOSIS — Z74.09 DECREASED FUNCTIONAL MOBILITY AND ENDURANCE: ICD-10-CM

## 2024-02-23 DIAGNOSIS — M25.561 ACUTE PAIN OF RIGHT KNEE: Primary | ICD-10-CM

## 2024-02-23 PROCEDURE — 97760 ORTHOTIC MGMT&TRAING 1ST ENC: CPT

## 2024-02-23 PROCEDURE — 97161 PT EVAL LOW COMPLEX 20 MIN: CPT

## 2024-02-23 PROCEDURE — 97110 THERAPEUTIC EXERCISES: CPT

## 2024-02-23 NOTE — PROGRESS NOTES
with involved LE demonstrating good functional strength for progression to dynamic loading  Pt will perform at least 10 consecutive repetitions of anterior step downs on 8 inch box (without dynamic knee valgus and good single limb stability symmetrical to contralateral side) demonstrating appropriate eccentric control and functional strength for progression to dynamic loading  Pt will perform Y balance test with composite score at least 90% of uninvolved limb demonstrating appropriate eccentric control and balance.  Pt will perform Single leg, Triple, and Crossover Hop Test within 70% of uninvolved limb demonstrating appropriate control, balance, and strength for progression to dynamic loading  Improve IKDC to ? 64/87 demonstrating decreased functional restrictions with ADLs, work, and dynamic loading  Pt will return to work with modifications or restricted duty.     Goals for progression to return to high level activities  Pt will demonstrate knee extension and flexion strength of involved limb at greater than or equal to 90% of uninvolved limb for reduced injury risk with return to prior level of function  Pt will demonstrate MMT of 5/5 globally to allow for normal ADL's and functional activities and for reduced injury risk with return to prior level of function  Pt will perform at least 90% of repetitions of single leg squat in 30s with involved LE relative to uninvolved LE demonstrating good functional strength and control for reduced injury risk with return to prior level of function  Pt will perform at least 90% of repetitions of anterior step downs on 8 inch box in 30s with involved LE relative to uninvolved LE (without dynamic knee valgus and good single limb stability symmetrical to contralateral side) demonstrating appropriate eccentric control and functional strength for reduced injury risk with return to prior level of function  Pt will perform Y balance test with anterior reach within 4cm and posterior

## 2024-02-27 ENCOUNTER — TELEPHONE (OUTPATIENT)
Age: 39
End: 2024-02-27

## 2024-02-27 NOTE — TELEPHONE ENCOUNTER
Pt did not show for their scheduled therapy appointment today.    Reason: unknown  Communication: KASHIF, reminded of appointment on 3/1, requested she contact [phone] to confirm

## 2024-03-01 ENCOUNTER — CLINICAL DOCUMENTATION (OUTPATIENT)
Dept: ORTHOPEDIC SURGERY | Age: 39
End: 2024-03-01

## 2024-03-01 NOTE — PROGRESS NOTES
Name: Cyndi Fay  YOB: 1985  Gender: female  MRN: [id number]      Procedure: Right Knee Arthroscopy Revision Acl Reconstruction And Medial Meniscus Repair    Supine - Right    Surgery Date: 2/21/2024    Subjective: Returns 2 weeks status ACL reconstruction.  Pain is controlled better on Norco, improving with motion.  She is seeing [facility] at [facility] for physical therapy.      Physical Examination: Incisions are healing well.  Able to activate quad but has appropriate atrophy.  Passive extension 2-3 degrees short of extension flexion to 70 degrees.  Patella is mobile. Moderate effusion.  Motor and sensory intact.    Assessment:   S/P ACL reconstruction and medial meniscal repair      Plan:     Suture tails clipped Steri-Strips changed today.  Advised ice and compression therapy.  Continue PT per Allograft ACL reconstruction and medial meniscus repair protocol.  0 to 90 degrees range of motion x 4 weeks touchdown weightbearing less than 25% x 6 weeks due to the bucket-handle medial meniscus tear     Reviewed appropriate postoperative precautions at this point    Images were taken in the OR    Follow up: 4 weeks

## 2024-03-01 NOTE — PROGRESS NOTES
Name: Cyndi Fay  YOB: 1985  Gender: female  MRN: [id number]      Procedure: Right Knee Arthroscopy Revision Acl Reconstruction And Medial Meniscus Repair    Supine - Right    Surgery Date: 2/21/2024          Subjective: Returns 2 weeks status ACL reconstruction.  Pain is controlled improving with motion.  ***      Physical Examination: Incisions are healing well.  Able to activate quad but has appropriate atrophy.  Passive extension to about *** degrees of hyperextension flexion to *** degrees.  Patella is mobile. *** appropriate effusion.  Motor and sensory intact.       Imaging:  Knee XR: 2 views     Clinical Indication  No diagnosis found.       Report: AP, lateral, views of the {RIGHT/LEFT:16} knee demonstrates postsurgical changes from ACL reconstruction with tunnels and fixation hardware in appropriate position    Impression: Status post ACL reconstruction as above         Assessment:   S/P ACL reconstruction      Plan:     Suture tails clipped Steri-Strips changed today.  Advised ice and compression therapy.  Continue PT per *** ACL reconstruction protocol.  Reviewed appropriate postoperative precautions at this point    Follow up: 4 weeks      Catherine Acosta PA-C  Orthopaedics and Sports Medicine

## 2024-03-04 ENCOUNTER — TREATMENT (OUTPATIENT)
Age: 39
End: 2024-03-04
Payer: COMMERCIAL

## 2024-03-04 ENCOUNTER — TELEPHONE (OUTPATIENT)
Dept: ORTHOPEDIC SURGERY | Age: 39
End: 2024-03-04

## 2024-03-04 DIAGNOSIS — Z74.09 DECREASED FUNCTIONAL MOBILITY AND ENDURANCE: ICD-10-CM

## 2024-03-04 DIAGNOSIS — M25.661 DECREASED RANGE OF MOTION OF RIGHT KNEE: ICD-10-CM

## 2024-03-04 DIAGNOSIS — R29.898 WEAKNESS OF BOTH LOWER EXTREMITIES: ICD-10-CM

## 2024-03-04 DIAGNOSIS — S83.511A RUPTURE OF ANTERIOR CRUCIATE LIGAMENT OF RIGHT KNEE, INITIAL ENCOUNTER: ICD-10-CM

## 2024-03-04 DIAGNOSIS — Z78.9 IMPAIRED MOTOR CONTROL: ICD-10-CM

## 2024-03-04 DIAGNOSIS — M25.561 ACUTE PAIN OF RIGHT KNEE: Primary | ICD-10-CM

## 2024-03-04 DIAGNOSIS — S83.241D ACUTE MEDIAL MENISCUS TEAR OF RIGHT KNEE, SUBSEQUENT ENCOUNTER: Primary | ICD-10-CM

## 2024-03-04 PROCEDURE — 97110 THERAPEUTIC EXERCISES: CPT

## 2024-03-04 PROCEDURE — 97140 MANUAL THERAPY 1/> REGIONS: CPT

## 2024-03-04 RX ORDER — HYDROCODONE BITARTRATE AND ACETAMINOPHEN 5; 325 MG/1; MG/1
1 TABLET ORAL EVERY 6 HOURS PRN
Qty: 20 TABLET | Refills: 0 | Status: SHIPPED | OUTPATIENT
Start: 2024-03-04 | End: 2024-03-09

## 2024-03-04 NOTE — PROGRESS NOTES
[facility]TH[facility]  [address] [place] [id number]  Dept: [phone]      Physical Therapy Daily Note     Referring MD: Michelle Monahan ATC  Diagnosis:     ICD-10-CM    1. Acute pain of right knee  M25.561       2. Decreased range of motion of right knee  M25.661       3. Weakness of both lower extremities  R29.898       4. Impaired motor control  Z78.9       5. Decreased functional mobility and endurance  Z74.09          Surgery:   1) right knee arthroscopically assisted revision ACL reconstruction with Achilles allograft   2) right  Knee arthroscopy with inside-out medial meniscus repair  3) removal of hardware right tibial interference screw   Date: 2/21/24  Therapy precautions:None  Rehab protocol:  Allograft ACL reconstruction and medial meniscus repair protocol.  0 to 90 degrees range of motion x 4 weeks touchdown weightbearing less than 25% x 6 weeks due to the bucket-handle medial meniscus tear     Co-morbidities affecting plan of care: smoker, previous ACL R, use of allograft    Chief complaints/history of injury: Patient reports original injury occurred in January 2024 while dancing; she felt a pop in the R knee, with immediate pain and unable to bear weight. She notes a history of R patellar dislocations, along with a previous ACL reconstruction following a basketball injury. Following evaluation by orthopedics, she underwent additional ACL reconstruction.    Date symptoms began: 2/21/24  Nature of condition: Recent onset (initial onset within last 3 months)  Primary cause of current episode: Post-surgical  How did symptoms start: see above  Describe current symptoms: diffuse pain in R knee    Received previous outpatient therapy? No    Pain Assessment:  Pain location: RLE (diffuse, mid thigh and below)    Average Pain/symptom intensity (0-10 scale)  Last 24 hours: 7/10  Last week (1-7 days): 7/10  How often do you feel symptoms? Constant (% of time)  Description:

## 2024-03-05 ENCOUNTER — OFFICE VISIT (OUTPATIENT)
Dept: ORTHOPEDIC SURGERY | Age: 39
End: 2024-03-05

## 2024-03-05 DIAGNOSIS — Z09 S/P ORTHOPEDIC SURGERY, FOLLOW-UP EXAM: Primary | ICD-10-CM

## 2024-03-05 PROCEDURE — 99024 POSTOP FOLLOW-UP VISIT: CPT | Performed by: ORTHOPAEDIC SURGERY

## 2024-03-07 ENCOUNTER — CLINICAL DOCUMENTATION (OUTPATIENT)
Dept: ORTHOPEDIC SURGERY | Age: 39
End: 2024-03-07

## 2024-03-12 ENCOUNTER — TELEPHONE (OUTPATIENT)
Age: 39
End: 2024-03-12

## 2024-03-12 NOTE — TELEPHONE ENCOUNTER
Pt did not show for their scheduled therapy appointment today.    Reason: transportation  Communication: called/spoke with patient

## 2024-03-21 ENCOUNTER — TREATMENT (OUTPATIENT)
Age: 39
End: 2024-03-21

## 2024-03-21 DIAGNOSIS — R29.898 WEAKNESS OF BOTH LOWER EXTREMITIES: ICD-10-CM

## 2024-03-21 DIAGNOSIS — Z74.09 DECREASED FUNCTIONAL MOBILITY AND ENDURANCE: ICD-10-CM

## 2024-03-21 DIAGNOSIS — M25.661 DECREASED RANGE OF MOTION OF RIGHT KNEE: ICD-10-CM

## 2024-03-21 DIAGNOSIS — Z78.9 IMPAIRED MOTOR CONTROL: ICD-10-CM

## 2024-03-21 DIAGNOSIS — M25.561 ACUTE PAIN OF RIGHT KNEE: Primary | ICD-10-CM

## 2024-03-21 NOTE — PROGRESS NOTES
ADLs:  >40 SLR w/ no lag  30 reps calf raise  SL balance 15-25s (no brace)  Pt will walk safely with unlocked brace and without use of assistive device; allowing for improved household and community mobility  Pt will perform at least 15 repetitions in 30s Chair Stand assessment, keeping weight evenly distributed through R and L LEs; demonstrating improved function of surgical LE and a reduction in compensations due to pain and weakness.   Pt will independently ascend and descend steps with a reciprocal pattern, demonstrating good eccentric control and balance.  Improve IKDC to 55/87 demonstrating improved functional mobility.    Goals for progression to running (minimum of 12 weeks post operative)  Pt will walk with normal gait for community mobility and d/c use of brace with all activities.    Pt will demonstrate knee extension and flexion strength of involved limb within 70% of uninvolved limb for progression to dynamic loading  Pt will demonstrate pain free flexion of involved LE within 95% of uninvolved limb for improved squatting and functional mobility.     Pt will demonstrate MMT of 4+/5 globally for maximal stability with gait.   Pt will perform at least 10 repetitions of single leg squat with involved LE demonstrating good functional strength for progression to dynamic loading  Pt will perform at least 10 consecutive repetitions of anterior step downs on 8 inch box (without dynamic knee valgus and good single limb stability symmetrical to contralateral side) demonstrating appropriate eccentric control and functional strength for progression to dynamic loading  Pt will perform Y balance test with composite score at least 90% of uninvolved limb demonstrating appropriate eccentric control and balance.  Pt will perform Single leg, Triple, and Crossover Hop Test within 70% of uninvolved limb demonstrating appropriate control, balance, and strength for progression to dynamic loading  Improve IKDC to ? 64/87

## 2024-03-22 PROBLEM — Z09 STATUS POST ORTHOPEDIC SURGERY, FOLLOW-UP EXAM: Status: RESOLVED | Noted: 2024-02-21 | Resolved: 2024-03-22

## 2024-03-27 ENCOUNTER — TELEPHONE (OUTPATIENT)
Age: 39
End: 2024-03-27

## 2024-03-27 NOTE — TELEPHONE ENCOUNTER
Pt did not show for their scheduled therapy appointment today.    Reason: unknown  Communication: KASHIF

## 2024-04-02 ENCOUNTER — TELEPHONE (OUTPATIENT)
Dept: ORTHOPEDIC SURGERY | Age: 39
End: 2024-04-02

## 2024-04-02 ENCOUNTER — PATIENT MESSAGE (OUTPATIENT)
Dept: ORTHOPEDIC SURGERY | Age: 39
End: 2024-04-02

## 2024-04-02 NOTE — TELEPHONE ENCOUNTER
From: Cyndi Fay  To: Dr. Wilmer Monterroso  Sent: 4/1/2024 1:11 PM EDT  Subject: Appointment Request    Appointment Request From: Cyndi Fay    With Provider: Wilmer Monterroso MD [[facility]]    Preferred Date Range: 4/3/2024 – 4/4/2024    Preferred Times: Any Time    Reason for visit: Request an Appointment    Comments:  Missed appointment on 27th want to reschedule    Consent: The patient's consent was obtained including but not limited to risks of crusting, scabbing, blistering, scarring, darker or lighter pigmentary change, recurrence, incomplete removal and infection. Render Note In Bullet Format When Appropriate: No Duration Of Freeze Thaw-Cycle (Seconds): 0 Post-Care Instructions: I reviewed with the patient in detail post-care instructions. Patient is to wear sunprotection, and avoid picking at any of the treated lesions. Pt may apply Vaseline to crusted or scabbing areas. Detail Level: Simple Number Of Freeze-Thaw Cycles: 1 freeze-thaw cycle

## 2024-04-02 NOTE — TELEPHONE ENCOUNTER
The patient sent a [facility] appointment request to reschedule the post op appointment that was missed on 4/2/24 please.

## 2024-04-04 ENCOUNTER — TELEPHONE (OUTPATIENT)
Dept: ORTHOPEDIC SURGERY | Age: 39
End: 2024-04-04

## 2024-04-04 NOTE — TELEPHONE ENCOUNTER
Patient originally sent a [employer] message on 4/2/24 requesting that a post op appointment that she missed be rescheduled.  She stated in a [employer] message today that she hasn't heard from anyone to reschedule her yet.  She asks for a call back today to get that post op appointment rescheduled please.

## 2024-04-09 ENCOUNTER — TELEPHONE (OUTPATIENT)
Dept: ORTHOPEDIC SURGERY | Age: 39
End: 2024-04-09

## 2024-04-09 NOTE — TELEPHONE ENCOUNTER
Patient called to reschedule her 9 am appt. Patient says she does not feel comfortable coming out in the rain with her crutches. Please callback

## 2024-04-10 ENCOUNTER — TREATMENT (OUTPATIENT)
Age: 39
End: 2024-04-10
Payer: COMMERCIAL

## 2024-04-10 DIAGNOSIS — Z74.09 DECREASED FUNCTIONAL MOBILITY AND ENDURANCE: ICD-10-CM

## 2024-04-10 DIAGNOSIS — R29.898 WEAKNESS OF BOTH LOWER EXTREMITIES: ICD-10-CM

## 2024-04-10 DIAGNOSIS — Z78.9 IMPAIRED MOTOR CONTROL: ICD-10-CM

## 2024-04-10 DIAGNOSIS — M25.661 DECREASED RANGE OF MOTION OF RIGHT KNEE: ICD-10-CM

## 2024-04-10 DIAGNOSIS — M25.561 ACUTE PAIN OF RIGHT KNEE: Primary | ICD-10-CM

## 2024-04-10 PROCEDURE — 97116 GAIT TRAINING THERAPY: CPT

## 2024-04-10 PROCEDURE — 97140 MANUAL THERAPY 1/> REGIONS: CPT

## 2024-04-10 PROCEDURE — 97110 THERAPEUTIC EXERCISES: CPT

## 2024-04-10 NOTE — PROGRESS NOTES
pain/spasms and swelling  Home exercise program (HEP) development  Modalities prn to address pain, spasms, and swelling: (48121) Vasopneumatic compression  (06973) Hot/cold pack  Orthotic codes: (55041) Initial orthotic management and training    GOALS     Short term goals to be met by 3/22/2024  (4 weeks):   Pt will demonstrate good recall of HEP requiring minimal verbal cuing for proper form and technique. Goal Met 4/10/2024  Pt will demonstrate knee ROM of 0-90 per operative limitations Goal Not Met 4/10/2024 - Continue  Pt will perform an independent SLR with extension lag <5 degrees for improved knee stability in gait.  Goal Not Met 4/10/2024 - Continue  Pt will maintain TDWB status, demonstrating adherence to post operative limitations Goal Met 4/10/2024  Pt will complete IKDC to determine functional limitations. Goal Met 4/10/2024  Discontinue use of prescription pain meds demonstrating an overall reduction in pain. Goal Met 4/10/2024    Short term goals to be met by 4/19/2024  (8 weeks):   Pt will demonstrate knee extension of involved LE that is greater than or equal to uninvolved LE for improved participation in ADLs.  Pt will demonstrate knee flexion that is within 10 degrees of uninvolved LE for improved participation in ADLs.  Pt will demonstrate MMT of 5/5 in quad set and at least 4/5 globally for improved knee and LE stability with gait.  Pt will complete 4/4 positions of 4 Stage Balance Test with bias to injured LE demonstrating improved functional stability of involved LE, allowing for discontinuation of assistive device.  Pt will complete the following assessments to allow for unlocking of brace in ADLs:  >40 SLR w/ no lag  30 reps calf raise  SL balance 15-25s (no brace)  Pt will walk safely with unlocked brace and without use of assistive device; allowing for improved household and community mobility  Pt will perform at least 15 repetitions in 30s Chair Stand assessment, keeping weight evenly

## 2024-04-10 NOTE — PROGRESS NOTES
Name: Cyndi Fay  YOB: 1985  Gender: female  MRN: [id number]    Procedure Performed:  Right Knee Arthroscopy Revision Acl Reconstruction And Medial Meniscus Repair    Supine - Right     Date of Procedure: 2/21/24    Subjective: Returns 8 weeks status post the above procedure. She has missed several appointments due to transportation issues and  weather-related issues.     Physical Examination: Incisions clean dry and intact, no sign of infection.  Surgical scars well-healed. Motor and sensory intact.  She is struggling to get full extension in clinic today.  On 4/10/2024 with Jung she was able to get 2 degrees of hyperextension.  She got about 90 degrees of flexion today.  Slight joint effusion noted.  Calf is soft nontender palpation.    Assessment:   1. S/P orthopedic surgery, follow-up exam         Plan:   Doing well - I would have liked for her to have full extension tpday and we discussed stretching to do at home to help her be consistent with the extension.  Continue PT per Allograft ACL reconstruction and medial meniscus repair protocol.  0 to 90 degrees range of motion x 4 weeks touchdown weightbearing less than 25% x 6 weeks due to the bucket-handle medial meniscus tear   Follow up in 4 weeks

## 2024-04-11 NOTE — PROGRESS NOTES
strength and control for reduced injury risk with return to prior level of function  Pt will perform at least 90% of repetitions of anterior step downs on 8 inch box in 30s with involved LE relative to uninvolved LE (without dynamic knee valgus and good single limb stability symmetrical to contralateral side) demonstrating appropriate eccentric control and functional strength for reduced injury risk with return to prior level of function  Pt will perform Y balance test with anterior reach within 4cm and posterior motions within 6cm of uninvolved limb demonstrating appropriate eccentric control and balance for reduced injury risk with return to prior level of function.  Pt will perform Single leg, Triple, and Crossover Hop Test within 85% of uninvolved limb demonstrating appropriate control, balance, and strength for reduced injury risk with return to prior level of function  Improve IKDC to ? 78/87, demonstrating minimal functional restrictions with ADLs, work, and high level activities    NEHP  Access Code: [email or web address]  URL: [email or web address]  Date: 04/10/2024  Prepared by: Jung Corbin    Exercises  - Supine Heel Slide with Strap  - 4 x daily - 2 sets - 20 reps - 5 hold  - Quad Set with Strap  - 4 x daily - 2 sets - 20 reps - 5 hold  - Seated Knee Flexion Extension AROM   - 4 x daily - 3 sets - 20 reps - 5 hold  - Supine Knee Extension Strengthening  - 4 x daily - 3 sets - 15 reps - 2 hold (SAQ)  - Side Stepping with Counter Support  - 2 x daily - 2 sets - 15 reps  - Forward Backward Monster Walk with Band at Thighs and Counter Support  - 2 x daily - 2 sets - 15 reps  - Standing March with Counter Support  - 2 x daily - 2 sets - 15 reps  - Seated Transversus Abdominis Bracing  - 2 x daily - 2 sets - 15 reps - 3 hold  - Hooklying Gluteal Sets  - 2 x daily - 2 sets - 15 reps - 3 hold

## 2024-04-12 ENCOUNTER — TREATMENT (OUTPATIENT)
Age: 39
End: 2024-04-12

## 2024-04-12 DIAGNOSIS — R29.898 WEAKNESS OF BOTH LOWER EXTREMITIES: ICD-10-CM

## 2024-04-12 DIAGNOSIS — M25.661 DECREASED RANGE OF MOTION OF RIGHT KNEE: ICD-10-CM

## 2024-04-12 DIAGNOSIS — Z74.09 DECREASED FUNCTIONAL MOBILITY AND ENDURANCE: ICD-10-CM

## 2024-04-12 DIAGNOSIS — M25.561 ACUTE PAIN OF RIGHT KNEE: Primary | ICD-10-CM

## 2024-04-12 DIAGNOSIS — Z78.9 IMPAIRED MOTOR CONTROL: ICD-10-CM

## 2024-04-15 ENCOUNTER — OFFICE VISIT (OUTPATIENT)
Dept: ORTHOPEDIC SURGERY | Age: 39
End: 2024-04-15

## 2024-04-15 DIAGNOSIS — Z09 S/P ORTHOPEDIC SURGERY, FOLLOW-UP EXAM: Primary | ICD-10-CM

## 2024-04-15 PROCEDURE — 99024 POSTOP FOLLOW-UP VISIT: CPT | Performed by: PHYSICIAN ASSISTANT

## 2024-04-17 ENCOUNTER — TREATMENT (OUTPATIENT)
Age: 39
End: 2024-04-17
Payer: COMMERCIAL

## 2024-04-17 DIAGNOSIS — M25.561 ACUTE PAIN OF RIGHT KNEE: Primary | ICD-10-CM

## 2024-04-17 DIAGNOSIS — Z74.09 DECREASED FUNCTIONAL MOBILITY AND ENDURANCE: ICD-10-CM

## 2024-04-17 DIAGNOSIS — R29.898 WEAKNESS OF BOTH LOWER EXTREMITIES: ICD-10-CM

## 2024-04-17 DIAGNOSIS — Z78.9 IMPAIRED MOTOR CONTROL: ICD-10-CM

## 2024-04-17 DIAGNOSIS — M25.661 DECREASED RANGE OF MOTION OF RIGHT KNEE: ICD-10-CM

## 2024-04-17 PROCEDURE — 97140 MANUAL THERAPY 1/> REGIONS: CPT

## 2024-04-17 PROCEDURE — 97110 THERAPEUTIC EXERCISES: CPT

## 2024-04-17 PROCEDURE — 97530 THERAPEUTIC ACTIVITIES: CPT

## 2024-04-17 NOTE — PROGRESS NOTES
least 2x prior to next visit    ASSESSMENT     Patient demonstrates:  Progression of flexion ROM to 104, with preservation of extension  Appropriate tolerance of Narinder ambulation with locked brace     She has improvements in:  AROM s/p manual, achieving 2-112  Movement and load tolerance, with  Introduction to upright cycle and functional movements  Progression of hip and knee engagement with increased approximation or lever length     Continues with:  Gross deficits in R knee ROM, LQ strength, and function secondary to R ACLR  Subject to post operative restrictions in function    PLAN     Continue therapy per POC/ACL R protocol with progressive weight bearing. Manual treatment for restoration of ROM and mobility, with effusion management PRN. Progress activities for quad and hip engagement, with integration and increased emphasis on functional motion, within tolerance and protocol limits.    PLAN OF CARE     Effective Dates/Duration: 2/23/2024 TO 5/26/2024 (90 days).    Frequency: 2x/week   Interventions may include but are not limited to:   (34061) Therapeutic exercise to develop ROM, strength, endurance and flexibility  (56000) Therapeutic activities using dynamic activities to improve function  (14967) Gait training to address mechanics, proper step length and weight shifting to improve household and community mobility as well as overall safety with ADLs  (58399) Manual therapy techniques to improve joint and/or soft tissue mobility, ROM, and function as well as helping to decrease pain/spasms and swelling  Home exercise program (HEP) development  Modalities prn to address pain, spasms, and swelling: (83857) Vasopneumatic compression  (96067) Hot/cold pack  Orthotic codes: (00010) Initial orthotic management and training    GOALS     Short term goals to be met by 3/22/2024  (4 weeks):   Pt will demonstrate good recall of HEP requiring minimal verbal cuing for proper form and technique. Goal Met 4/10/2024  Pt will

## 2024-04-22 ENCOUNTER — TREATMENT (OUTPATIENT)
Age: 39
End: 2024-04-22
Payer: COMMERCIAL

## 2024-04-22 ENCOUNTER — TELEPHONE (OUTPATIENT)
Dept: INTERNAL MEDICINE CLINIC | Facility: CLINIC | Age: 39
End: 2024-04-22

## 2024-04-22 DIAGNOSIS — Z78.9 IMPAIRED MOTOR CONTROL: ICD-10-CM

## 2024-04-22 DIAGNOSIS — M25.561 ACUTE PAIN OF RIGHT KNEE: Primary | ICD-10-CM

## 2024-04-22 DIAGNOSIS — Z74.09 DECREASED FUNCTIONAL MOBILITY AND ENDURANCE: ICD-10-CM

## 2024-04-22 DIAGNOSIS — M25.661 DECREASED RANGE OF MOTION OF RIGHT KNEE: ICD-10-CM

## 2024-04-22 DIAGNOSIS — R29.898 WEAKNESS OF BOTH LOWER EXTREMITIES: ICD-10-CM

## 2024-04-22 PROCEDURE — 97110 THERAPEUTIC EXERCISES: CPT

## 2024-04-22 PROCEDURE — 97140 MANUAL THERAPY 1/> REGIONS: CPT

## 2024-04-22 NOTE — PROGRESS NOTES
[facility]  [address] [place] [address]  Dept: [phone]      Physical Therapy Daily Note     Referring MD: Wilmer Monterroso MD  Diagnosis:     ICD-10-CM    1. Acute pain of right knee  M25.561       2. Decreased range of motion of right knee  M25.661       3. Impaired motor control  Z78.9       4. Weakness of both lower extremities  R29.898       5. Decreased functional mobility and endurance  Z74.09         Surgery:   1) right knee arthroscopically assisted revision ACL reconstruction with Achilles allograft   2) right  Knee arthroscopy with inside-out medial meniscus repair  3) removal of hardware right tibial interference screw  Date: 2/21/24  Therapy precautions:None  Rehab protocol:  Allograft ACL reconstruction and medial meniscus repair protocol.  0 to 90 degrees range of motion x 4 weeks touchdown weightbearing less than 25% x 6 weeks due to the bucket-handle medial meniscus tear     Co-morbidities affecting plan of care: smoker, previous ACL R, use of allograft    Chief complaints/history of injury: Patient reports original injury occurred in January 2024 while dancing; she felt a pop in the R knee, with immediate pain and unable to bear weight. She notes a history of R patellar dislocations, along with a previous ACL reconstruction following a basketball injury. Following evaluation by orthopedics, she underwent additional ACL reconstruction.    PN (4/10/24)  Nature of condition: Recent onset (initial onset within last 3 months)  Describe current symptoms: soreness with HEP activities     Pain Assessment:  Pain location: medial joint line    Average Pain/symptom intensity (0-10 scale)  Last 24 hours: 2/10  Last week (1-7 days): 2/10  How often do you feel symptoms? Intermittently (0-25%)     How much have your symptoms interfered with daily activities? Quite a bit  How has your condition changed since receiving care at this facility? Better  In general, would you say

## 2024-04-22 NOTE — TELEPHONE ENCOUNTER
On 4/22/24, at 10:30 AM, patient missed their appointment. I called patients home phone and couldn't reach but had to [facility]. No show letter being sent.

## 2024-04-25 ENCOUNTER — TELEPHONE (OUTPATIENT)
Age: 39
End: 2024-04-25

## 2024-04-25 NOTE — TELEPHONE ENCOUNTER
Pt did not show for their scheduled therapy appointment today.    Reason: family emergency  Communication: phone

## 2024-04-30 ENCOUNTER — TREATMENT (OUTPATIENT)
Age: 39
End: 2024-04-30
Payer: COMMERCIAL

## 2024-04-30 DIAGNOSIS — Z78.9 IMPAIRED MOTOR CONTROL: ICD-10-CM

## 2024-04-30 DIAGNOSIS — M25.661 DECREASED RANGE OF MOTION OF RIGHT KNEE: ICD-10-CM

## 2024-04-30 DIAGNOSIS — R29.898 WEAKNESS OF BOTH LOWER EXTREMITIES: ICD-10-CM

## 2024-04-30 DIAGNOSIS — M25.561 ACUTE PAIN OF RIGHT KNEE: Primary | ICD-10-CM

## 2024-04-30 DIAGNOSIS — Z74.09 DECREASED FUNCTIONAL MOBILITY AND ENDURANCE: ICD-10-CM

## 2024-04-30 PROCEDURE — 97530 THERAPEUTIC ACTIVITIES: CPT

## 2024-04-30 PROCEDURE — 97140 MANUAL THERAPY 1/> REGIONS: CPT

## 2024-04-30 PROCEDURE — 97110 THERAPEUTIC EXERCISES: CPT

## 2024-04-30 NOTE — PROGRESS NOTES
eccentric control and balance.  Improve IKDC to 55/87 demonstrating improved functional mobility.    Goals for progression to running (minimum of 12 weeks post operative)  Pt will walk with normal gait for community mobility and d/c use of brace with all activities.    Pt will demonstrate knee extension and flexion strength of involved limb within 70% of uninvolved limb for progression to dynamic loading  Pt will demonstrate pain free flexion of involved LE within 95% of uninvolved limb for improved squatting and functional mobility.     Pt will demonstrate MMT of 4+/5 globally for maximal stability with gait.   Pt will perform at least 10 repetitions of single leg squat with involved LE demonstrating good functional strength for progression to dynamic loading  Pt will perform at least 10 consecutive repetitions of anterior step downs on 8 inch box (without dynamic knee valgus and good single limb stability symmetrical to contralateral side) demonstrating appropriate eccentric control and functional strength for progression to dynamic loading  Pt will perform Y balance test with composite score at least 90% of uninvolved limb demonstrating appropriate eccentric control and balance.  Pt will perform Single leg, Triple, and Crossover Hop Test within 70% of uninvolved limb demonstrating appropriate control, balance, and strength for progression to dynamic loading  Improve IKDC to ? 64/87 demonstrating decreased functional restrictions with ADLs, work, and dynamic loading  Pt will return to work with modifications or restricted duty.     Goals for progression to return to high level activities  Pt will demonstrate knee extension and flexion strength of involved limb at greater than or equal to 90% of uninvolved limb for reduced injury risk with return to prior level of function  Pt will demonstrate MMT of 5/5 globally to allow for normal ADL's and functional activities and for reduced injury risk with return to prior

## 2024-05-02 ENCOUNTER — TREATMENT (OUTPATIENT)
Age: 39
End: 2024-05-02

## 2024-05-02 ENCOUNTER — TELEPHONE (OUTPATIENT)
Dept: OBGYN CLINIC | Age: 39
End: 2024-05-02

## 2024-05-02 DIAGNOSIS — R29.898 WEAKNESS OF BOTH LOWER EXTREMITIES: ICD-10-CM

## 2024-05-02 DIAGNOSIS — M25.561 ACUTE PAIN OF RIGHT KNEE: Primary | ICD-10-CM

## 2024-05-02 DIAGNOSIS — M25.661 DECREASED RANGE OF MOTION OF RIGHT KNEE: ICD-10-CM

## 2024-05-02 DIAGNOSIS — Z74.09 DECREASED FUNCTIONAL MOBILITY AND ENDURANCE: ICD-10-CM

## 2024-05-02 DIAGNOSIS — Z78.9 IMPAIRED MOTOR CONTROL: ICD-10-CM

## 2024-05-02 NOTE — PROGRESS NOTES
[facility] PT [facility] ORTHOPAEDICS  [address] [place] [address]  Dept: [phone]      Physical Therapy Daily Note     Referring MD: Wilmer Monterroso MD  Diagnosis:     ICD-10-CM    1. Acute pain of right knee  M25.561       2. Decreased range of motion of right knee  M25.661       3. Impaired motor control  Z78.9       4. Weakness of both lower extremities  R29.898       5. Decreased functional mobility and endurance  Z74.09         Surgery:   1) right knee arthroscopically assisted revision ACL reconstruction with Achilles allograft   2) right  Knee arthroscopy with inside-out medial meniscus repair  3) removal of hardware right tibial interference screw  Date: 2/21/24  Therapy precautions:None  Rehab protocol:  Allograft ACL reconstruction and medial meniscus repair protocol.  0 to 90 degrees range of motion x 4 weeks touchdown weightbearing less than 25% x 6 weeks due to the bucket-handle medial meniscus tear     Co-morbidities affecting plan of care: smoker, previous ACL R, use of allograft    Chief complaints/history of injury: Patient reports original injury occurred in January 2024 while dancing; she felt a pop in the R knee, with immediate pain and unable to bear weight. She notes a history of R patellar dislocations, along with a previous ACL reconstruction following a basketball injury. Following evaluation by orthopedics, she underwent additional ACL reconstruction.    PN (4/10/24)  Nature of condition: Recent onset (initial onset within last 3 months)  Describe current symptoms: soreness with HEP activities     Pain Assessment:  Pain location: medial joint line    Average Pain/symptom intensity (0-10 scale)  Last 24 hours: 2/10  Last week (1-7 days): 2/10  How often do you feel symptoms? Intermittently (0-25%)     How much have your symptoms interfered with daily activities? Quite a bit  How has your condition changed since receiving care at this facility? Better  In general, would you say 
normal (ped)...

## 2024-05-02 NOTE — TELEPHONE ENCOUNTER
Pt present to the office for Depo-Provera 150 mg/ml IM injection.     NDC 76405-591-93  Lot # [id number]  Exp 03/31/2027  Site 1 ml given in the RGM  Return 07/18 - 08/01 for next injection

## 2024-05-07 ENCOUNTER — TREATMENT (OUTPATIENT)
Age: 39
End: 2024-05-07
Payer: COMMERCIAL

## 2024-05-07 DIAGNOSIS — Z78.9 IMPAIRED MOTOR CONTROL: ICD-10-CM

## 2024-05-07 DIAGNOSIS — R29.898 WEAKNESS OF BOTH LOWER EXTREMITIES: ICD-10-CM

## 2024-05-07 DIAGNOSIS — M25.661 DECREASED RANGE OF MOTION OF RIGHT KNEE: ICD-10-CM

## 2024-05-07 DIAGNOSIS — M25.561 ACUTE PAIN OF RIGHT KNEE: Primary | ICD-10-CM

## 2024-05-07 DIAGNOSIS — Z74.09 DECREASED FUNCTIONAL MOBILITY AND ENDURANCE: ICD-10-CM

## 2024-05-07 PROCEDURE — 97140 MANUAL THERAPY 1/> REGIONS: CPT

## 2024-05-07 PROCEDURE — 97530 THERAPEUTIC ACTIVITIES: CPT

## 2024-05-07 NOTE — PROGRESS NOTES
[facility] PT [facility] ORTHOPAEDICS  [address] [place] [address]  Dept: [phone]      Physical Therapy Daily Note     Referring MD: Wilmer Monterroso MD  Diagnosis:     ICD-10-CM    1. Acute pain of right knee  M25.561       2. Decreased range of motion of right knee  M25.661       3. Impaired motor control  Z78.9       4. Weakness of both lower extremities  R29.898       5. Decreased functional mobility and endurance  Z74.09         Surgery:   1) right knee arthroscopically assisted revision ACL reconstruction with Achilles allograft   2) right  Knee arthroscopy with inside-out medial meniscus repair  3) removal of hardware right tibial interference screw  Date: 2/21/24  Therapy precautions:None  Rehab protocol:  Allograft ACL reconstruction and medial meniscus repair protocol.  0 to 90 degrees range of motion x 4 weeks touchdown weightbearing less than 25% x 6 weeks due to the bucket-handle medial meniscus tear     Co-morbidities affecting plan of care: smoker, previous ACL R, use of allograft    Chief complaints/history of injury: Patient reports original injury occurred in January 2024 while dancing; she felt a pop in the R knee, with immediate pain and unable to bear weight. She notes a history of R patellar dislocations, along with a previous ACL reconstruction following a basketball injury. Following evaluation by orthopedics, she underwent additional ACL reconstruction.    PN (4/10/24)  Nature of condition: Recent onset (initial onset within last 3 months)  Describe current symptoms: soreness with HEP activities     Pain Assessment:  Pain location: medial joint line    Average Pain/symptom intensity (0-10 scale)  Last 24 hours: 2/10  Last week (1-7 days): 2/10  How often do you feel symptoms? Intermittently (0-25%)     How much have your symptoms interfered with daily activities? Quite a bit  How has your condition changed since receiving care at this facility? Better  In general, would you say

## 2024-05-09 ENCOUNTER — TREATMENT (OUTPATIENT)
Age: 39
End: 2024-05-09
Payer: COMMERCIAL

## 2024-05-09 DIAGNOSIS — M25.561 ACUTE PAIN OF RIGHT KNEE: Primary | ICD-10-CM

## 2024-05-09 DIAGNOSIS — Z78.9 IMPAIRED MOTOR CONTROL: ICD-10-CM

## 2024-05-09 DIAGNOSIS — Z74.09 DECREASED FUNCTIONAL MOBILITY AND ENDURANCE: ICD-10-CM

## 2024-05-09 DIAGNOSIS — M25.661 DECREASED RANGE OF MOTION OF RIGHT KNEE: ICD-10-CM

## 2024-05-09 DIAGNOSIS — R29.898 WEAKNESS OF BOTH LOWER EXTREMITIES: ICD-10-CM

## 2024-05-09 PROCEDURE — 97110 THERAPEUTIC EXERCISES: CPT

## 2024-05-09 PROCEDURE — 97530 THERAPEUTIC ACTIVITIES: CPT

## 2024-05-09 PROCEDURE — 97140 MANUAL THERAPY 1/> REGIONS: CPT

## 2024-05-15 ENCOUNTER — TREATMENT (OUTPATIENT)
Age: 39
End: 2024-05-15

## 2024-05-15 DIAGNOSIS — M25.561 ACUTE PAIN OF RIGHT KNEE: Primary | ICD-10-CM

## 2024-05-15 DIAGNOSIS — Z74.09 DECREASED FUNCTIONAL MOBILITY AND ENDURANCE: ICD-10-CM

## 2024-05-15 DIAGNOSIS — M25.661 DECREASED RANGE OF MOTION OF RIGHT KNEE: ICD-10-CM

## 2024-05-15 DIAGNOSIS — R29.898 WEAKNESS OF BOTH LOWER EXTREMITIES: ICD-10-CM

## 2024-05-15 DIAGNOSIS — Z78.9 IMPAIRED MOTOR CONTROL: ICD-10-CM

## 2024-05-15 NOTE — PROGRESS NOTES
[facility] PT [facility] ORTHOPAEDICS  [address] [place] [address]  Dept: [phone]      Physical Therapy Updated Plan of Care     Referring MD: Wilmer Monterroso MD  Diagnosis:     ICD-10-CM    1. Acute pain of right knee  M25.561       2. Decreased range of motion of right knee  M25.661       3. Impaired motor control  Z78.9       4. Weakness of both lower extremities  R29.898       5. Decreased functional mobility and endurance  Z74.09         Surgery:   1) right knee arthroscopically assisted revision ACL reconstruction with Achilles allograft   2) right  Knee arthroscopy with inside-out medial meniscus repair  3) removal of hardware right tibial interference screw  Date: 2/21/24  Therapy precautions:None  Rehab protocol:  Allograft ACL reconstruction and medial meniscus repair protocol.  0 to 90 degrees range of motion x 4 weeks touchdown weightbearing less than 25% x 6 weeks due to the bucket-handle medial meniscus tear     Co-morbidities affecting plan of care: smoker, previous ACL R, use of allograft    Chief complaints/history of injury: Patient reports original injury occurred in January 2024 while dancing; she felt a pop in the R knee, with immediate pain and unable to bear weight. She notes a history of R patellar dislocations, along with a previous ACL reconstruction following a basketball injury. Following evaluation by orthopedics, she underwent additional ACL reconstruction.    PN (4/10/24)  Nature of condition: Recent onset (initial onset within last 3 months)  Describe current symptoms: soreness with HEP activities     Pain Assessment:  Pain location: medial joint line    Average Pain/symptom intensity (0-10 scale)  Last 24 hours: 2/10  Last week (1-7 days): 2/10  How often do you feel symptoms? Intermittently (0-25%)     How much have your symptoms interfered with daily activities? Quite a bit  How has your condition changed since receiving care at this facility? Better  In general,

## 2024-05-19 NOTE — PROGRESS NOTES
[facility] PT [facility] ORTHOPAEDICS  [address] [place] [address]  Dept: [phone]      Physical Therapy Daily Note     Referring MD: Wilmer Monterroso MD  Diagnosis:     ICD-10-CM    1. Acute pain of right knee  M25.561       2. Decreased range of motion of right knee  M25.661       3. Impaired motor control  Z78.9       4. Weakness of both lower extremities  R29.898       5. Decreased functional mobility and endurance  Z74.09         Surgery:   1) right knee arthroscopically assisted revision ACL reconstruction with Achilles allograft   2) right  Knee arthroscopy with inside-out medial meniscus repair  3) removal of hardware right tibial interference screw  Date: 2/21/24  Therapy precautions:None  Rehab protocol:  Allograft ACL reconstruction and medial meniscus repair protocol.  0 to 90 degrees range of motion x 4 weeks touchdown weightbearing less than 25% x 6 weeks due to the bucket-handle medial meniscus tear     Co-morbidities affecting plan of care: smoker, previous ACL R, use of allograft    Chief complaints/history of injury: Patient reports original injury occurred in January 2024 while dancing; she felt a pop in the R knee, with immediate pain and unable to bear weight. She notes a history of R patellar dislocations, along with a previous ACL reconstruction following a basketball injury. Following evaluation by orthopedics, she underwent additional ACL reconstruction.    PN (4/10/24)  Nature of condition: Recent onset (initial onset within last 3 months)  Describe current symptoms: soreness with HEP activities     Pain Assessment:  Pain location: medial joint line    Average Pain/symptom intensity (0-10 scale)  Last 24 hours: 2/10  Last week (1-7 days): 2/10  How often do you feel symptoms? Intermittently (0-25%)     How much have your symptoms interfered with daily activities? Quite a bit  How has your condition changed since receiving care at this facility? Better  In general, would you say

## 2024-05-20 ENCOUNTER — CLINICAL DOCUMENTATION (OUTPATIENT)
Dept: ORTHOPEDIC SURGERY | Age: 39
End: 2024-05-20

## 2024-05-20 ENCOUNTER — TREATMENT (OUTPATIENT)
Age: 39
End: 2024-05-20
Payer: COMMERCIAL

## 2024-05-20 DIAGNOSIS — M25.661 DECREASED RANGE OF MOTION OF RIGHT KNEE: ICD-10-CM

## 2024-05-20 DIAGNOSIS — Z74.09 DECREASED FUNCTIONAL MOBILITY AND ENDURANCE: ICD-10-CM

## 2024-05-20 DIAGNOSIS — Z78.9 IMPAIRED MOTOR CONTROL: ICD-10-CM

## 2024-05-20 DIAGNOSIS — R29.898 WEAKNESS OF BOTH LOWER EXTREMITIES: ICD-10-CM

## 2024-05-20 DIAGNOSIS — M25.561 ACUTE PAIN OF RIGHT KNEE: Primary | ICD-10-CM

## 2024-05-20 PROCEDURE — M5044 MISC THERA-BAND/TUBING: HCPCS | Performed by: PHYSICAL THERAPIST

## 2024-05-20 PROCEDURE — 97140 MANUAL THERAPY 1/> REGIONS: CPT | Performed by: PHYSICAL THERAPIST

## 2024-05-20 PROCEDURE — 97530 THERAPEUTIC ACTIVITIES: CPT | Performed by: PHYSICAL THERAPIST

## 2024-05-20 PROCEDURE — 97110 THERAPEUTIC EXERCISES: CPT | Performed by: PHYSICAL THERAPIST

## 2024-05-22 ENCOUNTER — TREATMENT (OUTPATIENT)
Age: 39
End: 2024-05-22
Payer: COMMERCIAL

## 2024-05-22 ENCOUNTER — OFFICE VISIT (OUTPATIENT)
Dept: ORTHOPEDIC SURGERY | Age: 39
End: 2024-05-22

## 2024-05-22 DIAGNOSIS — Z09 S/P ORTHOPEDIC SURGERY, FOLLOW-UP EXAM: Primary | ICD-10-CM

## 2024-05-22 DIAGNOSIS — R29.898 WEAKNESS OF BOTH LOWER EXTREMITIES: ICD-10-CM

## 2024-05-22 DIAGNOSIS — Z74.09 DECREASED FUNCTIONAL MOBILITY AND ENDURANCE: ICD-10-CM

## 2024-05-22 DIAGNOSIS — Z78.9 IMPAIRED MOTOR CONTROL: ICD-10-CM

## 2024-05-22 DIAGNOSIS — M25.561 ACUTE PAIN OF RIGHT KNEE: Primary | ICD-10-CM

## 2024-05-22 DIAGNOSIS — M25.661 DECREASED RANGE OF MOTION OF RIGHT KNEE: ICD-10-CM

## 2024-05-22 PROCEDURE — 97110 THERAPEUTIC EXERCISES: CPT

## 2024-05-22 PROCEDURE — 97530 THERAPEUTIC ACTIVITIES: CPT

## 2024-05-22 PROCEDURE — 99024 POSTOP FOLLOW-UP VISIT: CPT | Performed by: PHYSICIAN ASSISTANT

## 2024-05-22 PROCEDURE — 97140 MANUAL THERAPY 1/> REGIONS: CPT

## 2024-05-22 NOTE — PROGRESS NOTES
[facility] PT [facility] ORTHOPAEDICS  [address] [place] [address]  Dept: [phone]      Physical Therapy Daily Note     Referring MD: Wilmer Monterroso MD  Diagnosis:     ICD-10-CM    1. Acute pain of right knee  M25.561       2. Decreased range of motion of right knee  M25.661       3. Impaired motor control  Z78.9       4. Weakness of both lower extremities  R29.898       5. Decreased functional mobility and endurance  Z74.09         Surgery:   1) right knee arthroscopically assisted revision ACL reconstruction with Achilles allograft   2) right  Knee arthroscopy with inside-out medial meniscus repair  3) removal of hardware right tibial interference screw  Date: 2/21/24  Therapy precautions:None  Rehab protocol:  Allograft ACL reconstruction and medial meniscus repair protocol.  0 to 90 degrees range of motion x 4 weeks touchdown weightbearing less than 25% x 6 weeks due to the bucket-handle medial meniscus tear     Co-morbidities affecting plan of care: smoker, previous ACL R, use of allograft    Chief complaints/history of injury: Patient reports original injury occurred in January 2024 while dancing; she felt a pop in the R knee, with immediate pain and unable to bear weight. She notes a history of R patellar dislocations, along with a previous ACL reconstruction following a basketball injury. Following evaluation by orthopedics, she underwent additional ACL reconstruction.    PN (5/19/24)  Nature of condition: Recent onset (initial onset within last 3 months)  Describe current symptoms: patient denies symptoms     Pain Assessment:  Pain location: patient denies pain     How much have your symptoms interfered with daily activities? A little bit  How has your condition changed since receiving care at this facility? Much better  In general, would you say your current overall health is very good                Functional Outcome Questionnaire: Lower Extremity Functional Scale: 58/80= 72.5%

## 2024-05-22 NOTE — PROGRESS NOTES
Name: Cyndi Fay  YOB: 1985  Gender: female  MRN: [id number]    CC: Knee Pain (R)     HPI: Cyndi Fay is a 38 y.o. female who returns for follow up on the right knee.  She is now 11 weeks s/p right knee revision ACL reconstruction with medial meniscus repair.  She would like to go back to work but is aware that she will need restrictions.  She has amped up her appointment with [employer] and is now going twice a week.    Physical Examination:  General: no acute distress  Lungs: breathing easily  CV: regular rhythm by pulse  Right Knee:  Previous surgical scars well-healed.  No significant joint effusion today.  Stable varus valgus stress at full extension and 30 flexion.  Solid endpoint on Lachman's.  She does struggle with her flexion.  She does have about a 9 degree lag with her straight leg raise.  Quad atrophy present and prominent.    Assessment:     ICD-10-CM    1. S/P orthopedic surgery, follow-up exam  Z09         Plan:     I do think we can get her back to work but she will need restrictions.  We discussed sitting for 15 minutes for every hour she is working and we also discussed her not working on the floor that is not mobile at this time.  She is free to do her standing work an hour increments as long as the floor is stationary.  We discussed the importance of continuing quad activation and getting her quad strength back.  She will continue to work with [employer] twice a week.  I did mention that a quad stimulator for her to use at home but she did not seem like she would utilize this and states it is too expensive for her at this time.    Follow-up with Dr. Monterroso in 2 months.    Catherine Acosta PA-C   Orthopaedics and Sports Medicine

## 2024-05-29 ENCOUNTER — TELEPHONE (OUTPATIENT)
Age: 39
End: 2024-05-29

## 2024-05-29 ENCOUNTER — TELEPHONE (OUTPATIENT)
Dept: ORTHOPEDIC SURGERY | Age: 39
End: 2024-05-29

## 2024-05-29 NOTE — TELEPHONE ENCOUNTER
Patient left [facility] checking on [place] form, she says [facility] will not allow her back with restrictions so I assume the form we have is for extending her leave, think she said her time ended 5-26?

## 2024-05-29 NOTE — TELEPHONE ENCOUNTER
Pt did not show for today's scheduled PT appointment. PT called and LVM to request pt call and confirm next appt.

## 2024-05-30 ENCOUNTER — CLINICAL DOCUMENTATION (OUTPATIENT)
Dept: ORTHOPEDIC SURGERY | Age: 39
End: 2024-05-30

## 2024-05-30 ENCOUNTER — TREATMENT (OUTPATIENT)
Age: 39
End: 2024-05-30
Payer: COMMERCIAL

## 2024-05-30 DIAGNOSIS — M25.561 ACUTE PAIN OF RIGHT KNEE: Primary | ICD-10-CM

## 2024-05-30 DIAGNOSIS — Z74.09 DECREASED FUNCTIONAL MOBILITY AND ENDURANCE: ICD-10-CM

## 2024-05-30 DIAGNOSIS — Z78.9 IMPAIRED MOTOR CONTROL: ICD-10-CM

## 2024-05-30 DIAGNOSIS — M25.661 DECREASED RANGE OF MOTION OF RIGHT KNEE: ICD-10-CM

## 2024-05-30 DIAGNOSIS — R29.898 WEAKNESS OF BOTH LOWER EXTREMITIES: ICD-10-CM

## 2024-05-30 PROCEDURE — 97110 THERAPEUTIC EXERCISES: CPT

## 2024-05-30 PROCEDURE — 97140 MANUAL THERAPY 1/> REGIONS: CPT

## 2024-05-30 PROCEDURE — 97530 THERAPEUTIC ACTIVITIES: CPT

## 2024-05-30 NOTE — PROGRESS NOTES
[facility] PT [facility] ORTHOPAEDICS  [address] [place] [address]  Dept: [phone]      Physical Therapy Daily Note     Referring MD: Wilmer Monterroso MD  Diagnosis:     ICD-10-CM    1. Acute pain of right knee  M25.561       2. Decreased range of motion of right knee  M25.661       3. Impaired motor control  Z78.9       4. Weakness of both lower extremities  R29.898       5. Decreased functional mobility and endurance  Z74.09           Surgery:   1) right knee arthroscopically assisted revision ACL reconstruction with Achilles allograft   2) right  Knee arthroscopy with inside-out medial meniscus repair  3) removal of hardware right tibial interference screw  Date: 2/21/24  Therapy precautions:None  Rehab protocol:  Allograft ACL reconstruction and medial meniscus repair protocol.  0 to 90 degrees range of motion x 4 weeks touchdown weightbearing less than 25% x 6 weeks due to the bucket-handle medial meniscus tear     Co-morbidities affecting plan of care: smoker, previous ACL R, use of allograft    Chief complaints/history of injury: Patient reports original injury occurred in January 2024 while dancing; she felt a pop in the R knee, with immediate pain and unable to bear weight. She notes a history of R patellar dislocations, along with a previous ACL reconstruction following a basketball injury. Following evaluation by orthopedics, she underwent additional ACL reconstruction.    PN (5/19/24)  Nature of condition: Recent onset (initial onset within last 3 months)  Describe current symptoms: patient denies symptoms     Pain Assessment:  Pain location: patient denies pain     How much have your symptoms interfered with daily activities? A little bit  How has your condition changed since receiving care at this facility? Much better  In general, would you say your current overall health is very good                Functional Outcome Questionnaire: Lower Extremity Functional Scale: 58/80= 72.5%

## 2024-06-11 ENCOUNTER — TELEPHONE (OUTPATIENT)
Dept: ORTHOPEDIC SURGERY | Age: 39
End: 2024-06-11

## 2024-06-11 NOTE — TELEPHONE ENCOUNTER
Patient would like for someone to give her a call about her f/u appt in July. Pt spoke with her job and they need her to come back by 07/08. Please call

## 2024-06-12 ENCOUNTER — TELEPHONE (OUTPATIENT)
Age: 39
End: 2024-06-12

## 2024-06-13 NOTE — TELEPHONE ENCOUNTER
[employer] was ok with her going back to work and complete her necessary classes and then returning to work. I put the work note in the computer.

## 2024-06-14 ENCOUNTER — TREATMENT (OUTPATIENT)
Age: 39
End: 2024-06-14
Payer: COMMERCIAL

## 2024-06-14 DIAGNOSIS — R29.898 WEAKNESS OF BOTH LOWER EXTREMITIES: ICD-10-CM

## 2024-06-14 DIAGNOSIS — Z78.9 IMPAIRED MOTOR CONTROL: ICD-10-CM

## 2024-06-14 DIAGNOSIS — M25.661 DECREASED RANGE OF MOTION OF RIGHT KNEE: ICD-10-CM

## 2024-06-14 DIAGNOSIS — Z74.09 DECREASED FUNCTIONAL MOBILITY AND ENDURANCE: ICD-10-CM

## 2024-06-14 DIAGNOSIS — M25.561 ACUTE PAIN OF RIGHT KNEE: Primary | ICD-10-CM

## 2024-06-14 PROCEDURE — 97110 THERAPEUTIC EXERCISES: CPT

## 2024-06-14 PROCEDURE — 97140 MANUAL THERAPY 1/> REGIONS: CPT

## 2024-06-14 PROCEDURE — 97530 THERAPEUTIC ACTIVITIES: CPT

## 2024-06-14 NOTE — PROGRESS NOTES
functional deficits without instability: constant symptoms or those intensified with activity with mod loss of ROM, strength, or ADLs. Pt has not achieved max rehab potential and would benefit from continued skilled PT to address the above impairments and continue progress towards remaining therapy goals.     PLAN     Continue therapy per POC/ACLR protocol. Manual treatment PRN for symptom modulation with emphasis on restoration of flexion ROM. Advance activities for LQ strength and control, with progressive function and load as tolerated. Assess benchmarks detailed below as appropriate.    Effective Dates/Duration: 5/15/2024 TO 8/13/2024 (90 days).    Frequency:  1-2x/week    Interventions may include but are not limited to: (06971) Therapeutic exercise to develop ROM, strength, endurance and flexibility  (69097) Therapeutic activities using dynamic activities to improve function  (63938) Gait training to address mechanics, proper step length and weight shifting to improve household and community mobility as well as overall safety with ADLs  (39726) Manual therapy techniques to improve joint and/or soft tissue mobility, ROM, and function as well as helping to decrease pain/spasms and swelling  Home exercise program (HEP) development    GOALS     Revised (5/15/24)  Short term goals to be met by 6/12/2024  (4 weeks):  Pt will demonstrate knee extension of involved LE that is greater than or equal to uninvolved LE for improved participation in ADLs.  Goal Met 5/22/2024  Pt will demonstrate knee flexion that is within 10 degrees of uninvolved LE for improved participation in ADLs.Goal Met 5/22/2024  Pt will perform an independent SLR with extension lag <5 degrees for improved knee stability in gait.   Goal Met 6/14/2024  Pt will demonstrate MMT of 5/5 in quad set for improved knee stability with gait. Goal Not Met 6/14/2024 - Continue  Complete IKDC with score at 55/87, demonstrating improved functional mobility. Goal

## 2024-06-27 ENCOUNTER — TELEPHONE (OUTPATIENT)
Age: 39
End: 2024-06-27

## 2024-06-27 ENCOUNTER — TREATMENT (OUTPATIENT)
Age: 39
End: 2024-06-27
Payer: COMMERCIAL

## 2024-06-27 DIAGNOSIS — R29.898 WEAKNESS OF BOTH LOWER EXTREMITIES: ICD-10-CM

## 2024-06-27 DIAGNOSIS — M25.561 ACUTE PAIN OF RIGHT KNEE: Primary | ICD-10-CM

## 2024-06-27 DIAGNOSIS — Z78.9 IMPAIRED MOTOR CONTROL: ICD-10-CM

## 2024-06-27 DIAGNOSIS — Z74.09 DECREASED FUNCTIONAL MOBILITY AND ENDURANCE: ICD-10-CM

## 2024-06-27 DIAGNOSIS — M25.661 DECREASED RANGE OF MOTION OF RIGHT KNEE: ICD-10-CM

## 2024-06-27 PROCEDURE — 97110 THERAPEUTIC EXERCISES: CPT

## 2024-06-27 PROCEDURE — 97140 MANUAL THERAPY 1/> REGIONS: CPT

## 2024-06-27 PROCEDURE — 97530 THERAPEUTIC ACTIVITIES: CPT

## 2024-06-27 NOTE — PROGRESS NOTES
[facility] PT [facility] ORTHOPAEDICS  [address] [place] [address]  Dept: [phone]      Physical Therapy Daily Note     Referring MD: Wilmer Monterroso MD  Diagnosis:     ICD-10-CM    1. Acute pain of right knee  M25.561       2. Decreased range of motion of right knee  M25.661       3. Impaired motor control  Z78.9       4. Weakness of both lower extremities  R29.898       5. Decreased functional mobility and endurance  Z74.09         Surgery:   1) right knee arthroscopically assisted revision ACL reconstruction with Achilles allograft   2) right  Knee arthroscopy with inside-out medial meniscus repair  3) removal of hardware right tibial interference screw  Date: 2/21/24  Therapy precautions:None  Rehab protocol:  Allograft ACL reconstruction and medial meniscus repair protocol.  0 to 90 degrees range of motion x 4 weeks touchdown weightbearing less than 25% x 6 weeks due to the bucket-handle medial meniscus tear     Co-morbidities affecting plan of care: smoker, previous ACL R, use of allograft    Chief complaints/history of injury: Patient reports original injury occurred in January 2024 while dancing; she felt a pop in the R knee, with immediate pain and unable to bear weight. She notes a history of R patellar dislocations, along with a previous ACL reconstruction following a basketball injury. Following evaluation by orthopedics, she underwent additional ACL reconstruction.    PN (6/14/24)  Nature of condition: Chronic (continuous duration > 3 months)  Describe current symptoms: episodic soreness with extended duration activities    Pain Assessment:  Pain location: R medial knee/hamstring    Average Pain/symptom intensity (0-10 scale)  Last 24 hours: 1/10  Last week (1-7 days): 1/10  How often do you feel symptoms? Intermittently (0-25%)    How much have your symptoms interfered with daily activities? A little bit  How has your condition changed since receiving care at this facility? Much better  In

## 2024-06-27 NOTE — TELEPHONE ENCOUNTER
Pt did not show for their scheduled therapy appointment today.    Reason: work conflict  Communication: phone

## 2024-07-10 ENCOUNTER — TELEPHONE (OUTPATIENT)
Age: 39
End: 2024-07-10

## 2024-07-15 ENCOUNTER — TREATMENT (OUTPATIENT)
Age: 39
End: 2024-07-15
Payer: COMMERCIAL

## 2024-07-15 DIAGNOSIS — Z78.9 IMPAIRED MOTOR CONTROL: ICD-10-CM

## 2024-07-15 DIAGNOSIS — Z74.09 DECREASED FUNCTIONAL MOBILITY AND ENDURANCE: ICD-10-CM

## 2024-07-15 DIAGNOSIS — M25.561 ACUTE PAIN OF RIGHT KNEE: Primary | ICD-10-CM

## 2024-07-15 DIAGNOSIS — M25.661 DECREASED RANGE OF MOTION OF RIGHT KNEE: ICD-10-CM

## 2024-07-15 DIAGNOSIS — R29.898 WEAKNESS OF BOTH LOWER EXTREMITIES: ICD-10-CM

## 2024-07-15 PROCEDURE — 97110 THERAPEUTIC EXERCISES: CPT

## 2024-07-15 PROCEDURE — 97530 THERAPEUTIC ACTIVITIES: CPT

## 2024-07-15 PROCEDURE — 97140 MANUAL THERAPY 1/> REGIONS: CPT

## 2024-07-15 NOTE — PROGRESS NOTES
return to work with modifications or restricted duty. Goal Met 5/22/2024    Short term goals to be met by 7/10/2024  (8 weeks):  Pt will demonstrate pain free flexion of involved LE within 95% of uninvolved limb for improved squatting and functional mobility.   Goal Met 6/14/2024  Pt will demonstrate MMT of 4+/5 globally for maximal stability with gait.  Goal Met 6/14/2024  Pt will demonstrate knee extension and flexion strength of involved limb within 70% of uninvolved limb for progression to dynamic loading Goal Met 6/14/2024    Long term goals to be met by 8/7/2024  (12 weeks):  Pt will perform at least 10 repetitions of single leg squat with involved LE demonstrating good functional strength for progression to dynamic loading Goal Not Met 7/15/2024 - Continue  Pt will perform at least 10 consecutive repetitions of anterior step downs on 8 inch box (without dynamic knee valgus and good single limb stability symmetrical to contralateral side) demonstrating appropriate eccentric control and functional strength for progression to dynamic loading Goal Met 7/15/2024  Pt will perform Y balance test with composite score at least 90% of uninvolved limb demonstrating appropriate eccentric control and balance.  Pt will perform Single leg, Triple, and Crossover Hop Test within 70% of uninvolved limb demonstrating appropriate control, balance, and strength for progression to dynamic loading  Improve IKDC to ? 64/87 demonstrating decreased functional restrictions with ADLs, work, and dynamic loading    [employer]  Access Code: [email or web address]  URL: [email or web address]  Date: 06/14/2024  Prepared by: Jung Corbin    Exercises  - Supine Heel Slide with Strap  - 4 x daily - 2 sets - 20 reps - 5 hold  - Quad Set with Strap  - 4 x daily - 2 sets - 20 reps - 5 hold  - Seated Knee Flexion Extension AROM   - 4 x daily - 3 sets - 20 reps - 5 hold  - Supine Knee Extension Strengthening  - 4 x daily - 3 sets - 15 reps -

## 2024-07-18 ENCOUNTER — TELEPHONE (OUTPATIENT)
Dept: OBGYN CLINIC | Age: 39
End: 2024-07-18

## 2024-07-18 NOTE — TELEPHONE ENCOUNTER
Pt present to the office for Depo-Provera 150 mg/ml IM injection.     NDC 70474-148-89  Lot # [id number]  Exp 11/30/2027  Site 1 ml given in the LGM  Return 10/03 - 10/17 for next injection

## 2024-07-19 NOTE — PROGRESS NOTES
Name: Cyndi Fay  YOB: 1985  Gender: female  MRN: [id number]    CC: Knee Pain (R)     HPI: Cyndi Fay is a 38 y.o. female who returns for follow up on her right knee.  She states although I wrote a note stating she go back to work without any restrictions due to the fact that she was still in physical therapy she was not able to come back to work.  She is here today to get cleared to go back to work.    Physical Examination:  General: no acute distress  Lungs: breathing easily  CV: regular rhythm by pulse  Right Knee:     Surgical scars well-healed. No significant joint effusion today. Stable varus valgus stress at full extension and 30 flexion. Solid endpoint on Lachman's. She does struggle with her flexion. She does have about a 9 degree lag with her straight leg raise. Quad atrophy present and prominent.     Assessment:     ICD-10-CM    1. Rupture of anterior cruciate ligament of right knee, subsequent encounter  S83.511D       2. S/P orthopedic surgery, follow-up exam  Z09           Plan:     At this point she can return to work without any restrictions.  She has met all the appropriate rehab requirements to return to work safely.  She was given a work note stating she can go back today or tomorrow without restrictions.    NATE ManriqueC   Orthopaedics and Sports Medicine

## 2024-07-23 ENCOUNTER — OFFICE VISIT (OUTPATIENT)
Dept: ORTHOPEDIC SURGERY | Age: 39
End: 2024-07-23
Payer: COMMERCIAL

## 2024-07-23 DIAGNOSIS — Z09 S/P ORTHOPEDIC SURGERY, FOLLOW-UP EXAM: ICD-10-CM

## 2024-07-23 DIAGNOSIS — S83.511D RUPTURE OF ANTERIOR CRUCIATE LIGAMENT OF RIGHT KNEE, SUBSEQUENT ENCOUNTER: Primary | ICD-10-CM

## 2024-07-23 PROCEDURE — 99213 OFFICE O/P EST LOW 20 MIN: CPT | Performed by: PHYSICIAN ASSISTANT

## 2024-09-10 ENCOUNTER — TELEPHONE (OUTPATIENT)
Age: 39
End: 2024-09-10

## 2024-10-08 ENCOUNTER — PATIENT MESSAGE (OUTPATIENT)
Dept: ORTHOPEDIC SURGERY | Age: 39
End: 2024-10-08

## 2024-10-11 DIAGNOSIS — Z30.013 ENCOUNTER FOR PRESCRIPTION FOR DEPO-PROVERA: ICD-10-CM

## 2024-10-11 RX ORDER — MEDROXYPROGESTERONE ACETATE 150 MG/ML
150 INJECTION, SUSPENSION INTRAMUSCULAR
Refills: 3 | OUTPATIENT
Start: 2024-10-11

## 2024-10-18 ENCOUNTER — TREATMENT (OUTPATIENT)
Age: 39
End: 2024-10-18
Payer: COMMERCIAL

## 2024-10-18 DIAGNOSIS — Z78.9 IMPAIRED MOTOR CONTROL: ICD-10-CM

## 2024-10-18 DIAGNOSIS — M25.561 ACUTE PAIN OF RIGHT KNEE: Primary | ICD-10-CM

## 2024-10-18 DIAGNOSIS — Z74.09 DECREASED FUNCTIONAL MOBILITY AND ENDURANCE: ICD-10-CM

## 2024-10-18 DIAGNOSIS — R29.898 WEAKNESS OF BOTH LOWER EXTREMITIES: ICD-10-CM

## 2024-10-18 PROCEDURE — 97110 THERAPEUTIC EXERCISES: CPT

## 2024-10-18 PROCEDURE — 97140 MANUAL THERAPY 1/> REGIONS: CPT

## 2024-10-18 PROCEDURE — 97530 THERAPEUTIC ACTIVITIES: CPT

## 2024-10-18 NOTE — PROGRESS NOTES
[facility]  [address] [address]  Dept: [phone]      Physical Therapy Updated Plan of Care     Referring MD: Wilmer Monterroso MD  Diagnosis:     ICD-10-CM    1. Acute pain of right knee  M25.561       2. Impaired motor control  Z78.9       3. Weakness of both lower extremities  R29.898       4. Decreased functional mobility and endurance  Z74.09         Surgery:   1) right knee arthroscopically assisted revision ACL reconstruction with Achilles allograft   2) right  Knee arthroscopy with inside-out medial meniscus repair  3) removal of hardware right tibial interference screw  Date: 2/21/24  Therapy precautions:None  Rehab protocol:  Allograft ACL reconstruction and medial meniscus repair protocol.  0 to 90 degrees range of motion x 4 weeks touchdown weightbearing less than 25% x 6 weeks due to the bucket-handle medial meniscus tear     Co-morbidities affecting plan of care: smoker, previous ACL R, use of allograft    Chief complaints/history of injury: Patient reports original injury occurred in January 2024 while dancing; she felt a pop in the R knee, with immediate pain and unable to bear weight. She notes a history of R patellar dislocations, along with a previous ACL reconstruction following a basketball injury. Following evaluation by orthopedics, she underwent additional ACL reconstruction.    PN (7/18/24)  Nature of condition: Chronic (continuous duration > 3 months)  Describe current symptoms: patient denies symptoms    Pain Assessment:  Pain location: patient denies pain    Average Pain/symptom intensity (0-10 scale)    How much have your symptoms interfered with daily activities? A little bit  How has your condition changed since receiving care at this facility? Much better  In general, would you say your current overall health is very good     Functional Outcome Measures: IKDC: 55/87=63.2%    Neuro screen: denies numbness, tingling, and radiating

## 2025-01-07 NOTE — PROGRESS NOTES
Name: Cyndi Fay  YOB: 1985  Gender: female  MRN: [id number]    CC: Knee Pain (R)      HPI: Cyndi Fay is a 39 y.o. female who returns for follow up on right knee pain. She was last seen 7/23/24 and was released to go back to work with no restrictions. She is 11 months s/p a revision ACL reconstruction and medial meniscus repair.  She denies any new falls or specific injuries to this right knee.  She states she was back in the [employer] workforce without any pain or issues.  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 Her pain has gotten better over the last 3-4 days but she still cannot get it straight.  She notes she was not the best with her ACL rehab.  She rehabbed with Jung at our [address] location but was very inconsistent with her therapy sessions.  She is here today stating she does not have much pain but is unable to get this knee straight.    Physical Examination:  General: no acute distress  Lungs: breathing easily  CV: regular rhythm by pulse  Right Knee: Surgical scars well-healed.  No significant joint effusion noted.  Solid endpoint felt on Lachman's today.  Negative anterior and posterior drawer.  Ligamentously stable with varus and valgus stressing.  No tenderness to medial lateral joint line.  Negative Moya's on the medial lateral joint line.  She does have a significant amount of quad atrophy present.  She is not able to get the knee all the way straight without pain.  She was sitting in about 5 degrees of flexion today.  I can bend her to about 100 degrees before she has pain.  She is tender to palpate along the hamstring tendons.  Discussed and tenderness on popliteus today.  Calf is soft nontender palpation.  Dorsi and plantarflexion is intact.  Dorsalis pedis pulse 2+      Imaging:     Knee XR: 4 views     Clinical Indication  1. Rupture of anterior cruciate

## 2025-01-09 ENCOUNTER — EVALUATION (OUTPATIENT)
Age: 40
End: 2025-01-09

## 2025-01-09 ENCOUNTER — OFFICE VISIT (OUTPATIENT)
Dept: ORTHOPEDIC SURGERY | Age: 40
End: 2025-01-09
Payer: COMMERCIAL

## 2025-01-09 DIAGNOSIS — S83.241D ACUTE MEDIAL MENISCUS TEAR OF RIGHT KNEE, SUBSEQUENT ENCOUNTER: ICD-10-CM

## 2025-01-09 DIAGNOSIS — Z09 STATUS POST ORTHOPEDIC SURGERY, FOLLOW-UP EXAM: ICD-10-CM

## 2025-01-09 DIAGNOSIS — S83.511D RUPTURE OF ANTERIOR CRUCIATE LIGAMENT OF RIGHT KNEE, SUBSEQUENT ENCOUNTER: Primary | ICD-10-CM

## 2025-01-09 DIAGNOSIS — M25.661 DECREASED RANGE OF MOTION OF RIGHT KNEE: ICD-10-CM

## 2025-01-09 DIAGNOSIS — M25.561 ACUTE PAIN OF RIGHT KNEE: Primary | ICD-10-CM

## 2025-01-09 PROCEDURE — 99213 OFFICE O/P EST LOW 20 MIN: CPT | Performed by: PHYSICIAN ASSISTANT

## 2025-01-09 NOTE — OP NOTE
Operative Report    Patient: Cyndi Fay MRN: [id number]  SSN: [id number]    YOB: 1985  Age: 38 y.o.  Sex: female       Date of Surgery: 2/21/2024     Preoperative Diagnosis: 1) right knee recurrent ACL tear  2) right Knee bucket-handle medial Meniscus tear    Postoperative Diagnosis: Same    Surgeon(s) and Role:     * Wilmer Monterroso MD - Primary      Assistant: Catherine Acosta PA-C  The complexity of the surgery requires the use of a first assistant for patient positioning, graft prep, graft fixation, tunnel drilling and assistance in closure.  NATE Morin was this assistant and was there for the entirety of the case.     Anesthesia: General     Procedure:    1) right knee arthroscopically assisted revision ACL reconstruction with Achilles allograft   Please note due to the revision nature of the procedure and the advanced techniques required to avoid the previous tunnels and fill them this was substantially more difficult and time-consuming taking 50% longer than a primary ACL for me.  2) right  Knee arthroscopy with inside-out medial meniscus repair  3) removal of hardware right tibial interference screw      Estimated Blood Loss:  50 mL    Tourniquet Time:   Total Tourniquet Time Documented:  Thigh (Right) - 124 minutes  Total: Thigh (Right) - 124 minutes        Implants:   Arthrex tight rope on the femur with internal brace augmentation  Fast third interference screw tibia  Zone navigator sutures and FasT-Fix medial meniscus           Specimens: * No specimens in log *        Drains: None                Complications: None    Counts: Sponge and needle counts were correct times two.    Indications: See H&P      Procedure in Detail: After informed consent was obtained the surgical site was marked in the preoperative area by myself the surgeon.  Patient was brought to the operating room placed supine the operating table general anesthesia was induced.  Examination under 
No

## 2025-01-09 NOTE — PROGRESS NOTES
mobilization  extension  Patellar mobilization    Patient was issued the following HEP:  [email or web address] [facility]   Access Code: [email or web address]  URL: [email or web address]  Date: 01/09/2025  Prepared by: Jung Corbin, PT, DPT, SCS    Exercises  - Quad Set with Strap  - 2 x daily - 2 sets - 15 reps - 5 hold  - Hooklying Active Knee Extension with Hand Support  - 2 x daily - 2 sets - 15 reps - 5 hold  - Long Sitting 4 Way Patellar [place]  - 2 x daily - 10 reps    CLINICAL DECISION MAKING/ASSESSMENT     Objective findings revealed deficit in extension ROM secondary to hypertonic flexor musculature.  Overall deficits appeared to be:  ROM limitations  Soft tissue restrictions  Strength deficits  Muscle spasm  Motor control deficits  Impaired gait  Patient was issued a written copy of a HEP which they demonstrated with good form and technique.  Tactile and verbal cuing provided to facilitate proper form and technique. They will independently work on the program with the understanding that they can call for a PT appointment if their condition persists.

## 2025-01-16 ENCOUNTER — TELEPHONE (OUTPATIENT)
Dept: ORTHOPEDIC SURGERY | Age: 40
End: 2025-01-16

## 2025-02-17 NOTE — PROGRESS NOTES
Patient presents today for a routine gynecological examination with no complaints.    Last pap: 2021  Last pap results: NILM; HPV Negative    Last mammogram: Order placed today  Last mammogram results:    OB History          5    Para   5    Term   2            AB   0    Living   4         SAB   0    IAB        Ectopic        Molar        Multiple   1    Live Births   2                  GYN History           No LMP recorded. (Menstrual status: Chemically Induced).  negative postcoital bleeding    Past Medical History:  History reviewed. No pertinent past medical history.    Past Surgical History:  Past Surgical History:   Procedure Laterality Date     SECTION      x4    KNEE SURGERY Right 2024    RIGHT KNEE ARTHROSCOPY REVISION ACL RECONSTRUCTION AND MEDIAL MENISCUS REPAIR    SUPINE performed by Wilmer Monterroso MD at [facility]    ORTHOPEDIC SURGERY      OTHER SURGICAL HISTORY      oral    TUBAL LIGATION         Allergies:   No Known Allergies    Medication History:  Current Outpatient Medications   Medication Sig Dispense Refill    medroxyPROGESTERone (DEPO-PROVERA) 150 MG/ML injection Inject 150 mg into the muscle every 3 months 1 mL 3     No current facility-administered medications for this visit.       Social History:  Social History     Socioeconomic History    Marital status: Single     Spouse name: Not on file    Number of children: Not on file    Years of education: Not on file    Highest education level: Not on file   Occupational History    Not on file   Tobacco Use    Smoking status: Every Day     Current packs/day: 0.30     Types: Cigarettes    Smokeless tobacco: Never   Vaping Use    Vaping status: Never Used   Substance and Sexual Activity    Alcohol use: Yes     Comment: soc    Drug use: Never    Sexual activity: Not on file   Other Topics Concern    Not on file   Social History Narrative    Not on file     Social Determinants of Health     Financial Resource Strain:

## 2025-02-19 ENCOUNTER — OFFICE VISIT (OUTPATIENT)
Dept: OBGYN CLINIC | Age: 40
End: 2025-02-19
Payer: COMMERCIAL

## 2025-02-19 VITALS
BODY MASS INDEX: 28.06 KG/M2 | WEIGHT: 178.8 LBS | SYSTOLIC BLOOD PRESSURE: 126 MMHG | HEIGHT: 67 IN | DIASTOLIC BLOOD PRESSURE: 86 MMHG

## 2025-02-19 DIAGNOSIS — Z01.419 WELL WOMAN EXAM: Primary | ICD-10-CM

## 2025-02-19 DIAGNOSIS — Z30.013 ENCOUNTER FOR PRESCRIPTION FOR DEPO-PROVERA: ICD-10-CM

## 2025-02-19 DIAGNOSIS — Z12.4 SCREENING FOR CERVICAL CANCER: ICD-10-CM

## 2025-02-19 DIAGNOSIS — Z12.31 ENCOUNTER FOR SCREENING MAMMOGRAM FOR MALIGNANT NEOPLASM OF BREAST: ICD-10-CM

## 2025-02-19 DIAGNOSIS — Z13.89 SCREENING FOR GENITOURINARY CONDITION: ICD-10-CM

## 2025-02-19 LAB
BILIRUBIN, URINE, POC: NORMAL
BLOOD URINE, POC: NEGATIVE
GLUCOSE URINE, POC: NEGATIVE MG/DL
KETONES, URINE, POC: NORMAL MG/DL
LEUKOCYTE ESTERASE, URINE, POC: NEGATIVE
NITRITE, URINE, POC: NEGATIVE
PH, URINE, POC: 6 (ref 4.6–8)
PROTEIN,URINE, POC: 30 MG/DL
SPECIFIC GRAVITY, URINE, POC: 1.03 (ref 1–1.03)
URINALYSIS CLARITY, POC: NORMAL
URINALYSIS COLOR, POC: NORMAL
UROBILINOGEN, POC: NORMAL MG/DL

## 2025-02-19 PROCEDURE — 81003 URINALYSIS AUTO W/O SCOPE: CPT | Performed by: NURSE PRACTITIONER

## 2025-02-19 PROCEDURE — 99396 PREV VISIT EST AGE 40-64: CPT | Performed by: NURSE PRACTITIONER

## 2025-02-19 RX ORDER — MEDROXYPROGESTERONE ACETATE 150 MG/ML
150 INJECTION, SUSPENSION INTRAMUSCULAR
Qty: 1 ML | Refills: 3 | Status: SHIPPED | OUTPATIENT
Start: 2025-02-19

## 2025-03-05 ENCOUNTER — TELEPHONE (OUTPATIENT)
Dept: OBGYN CLINIC | Age: 40
End: 2025-03-05

## 2025-03-05 DIAGNOSIS — Z30.42 SURVEILLANCE FOR DEPO-PROVERA CONTRACEPTION: ICD-10-CM

## 2025-03-05 DIAGNOSIS — Z30.013 ENCOUNTER FOR PRESCRIPTION FOR DEPO-PROVERA: Primary | ICD-10-CM

## 2025-03-05 LAB
COLLECTION METHOD: ABNORMAL
CYTOLOGIST CVX/VAG CYTO: ABNORMAL
CYTOLOGY CVX/VAG DOC THIN PREP: ABNORMAL
DATE OF LMP: ABNORMAL
HPV APTIMA: NEGATIVE
Lab: ABNORMAL
PAP SOURCE: ABNORMAL
PATH REPORT.FINAL DX SPEC: ABNORMAL
PATHOLOGIST CVX/VAG CYTO: ABNORMAL
PATHOLOGIST PROVIDED ICD: ABNORMAL
PREV TREATMENT: ABNORMAL
STAT OF ADQ CVX/VAG CYTO-IMP: ABNORMAL

## 2025-03-05 NOTE — TELEPHONE ENCOUNTER
----- Message from VIJAY Sawyer - CNP sent at 3/5/2025  2:16 PM EST -----  Atypical cells on pap- HPV neg- repeat pap 1 year

## 2025-08-27 DIAGNOSIS — Z30.013 INITIATION OF DEPO PROVERA: Primary | ICD-10-CM

## (undated) DEVICE — 4-PORT MANIFOLD: Brand: NEPTUNE 2

## (undated) DEVICE — FOAM BUMP ROUND LARGE: Brand: MEDLINE INDUSTRIES, INC.

## (undated) DEVICE — SUTURE FIBERLOOP SZ 2-0 L20IN NONABSORBABLE BLU STR NDL AR7234

## (undated) DEVICE — SUTURE FIBERWIRE SZ 2 W/ TAPERED NEEDLE BLUE L38IN NONABSORB BLU L26.5MM 1/2 CIRCLE AR7200

## (undated) DEVICE — ZIMMER® STERILE DISPOSABLE TOURNIQUET CUFF WITH PLC, DUAL PORT, SINGLE BLADDER, 24 IN. (61 CM)

## (undated) DEVICE — SOLUTION IRRIG 3000ML 0.9% SOD CHL USP UROMATIC PLAS CONT

## (undated) DEVICE — Device

## (undated) DEVICE — DRILL SURG FLIPCUTTER III

## (undated) DEVICE — TUBING PMP L16FT MAIN DISP FOR AR-6400 AR-6475

## (undated) DEVICE — INTENDED FOR TISSUE SEPARATION, AND OTHER PROCEDURES THAT REQUIRE A SHARP SURGICAL BLADE TO PUNCTURE OR CUT.: Brand: BARD-PARKER ® STAINLESS STEEL BLADES

## (undated) DEVICE — SUTURE ABSORBABLE BRAIDED 0 CT-1 8X27 IN UD VICRYL JJ41G

## (undated) DEVICE — KIT INSTR TRANSTIBIAL CRUCE W/O SAW BLDE DISP

## (undated) DEVICE — SUTURE NONABSORBABLE BRAIDED 1.3 MM W/ LOOP WHT TIGERLINK

## (undated) DEVICE — PENCIL ES L3M BTTN SWCH HOLSTER W/ BLDE ELECTRD EDGE

## (undated) DEVICE — SYRINGE MED 5ML STD CLR PLAS LUERLOCK TIP N CTRL DISP

## (undated) DEVICE — BLADE SHV L13CM DIA5.5MM BNE CUT AGG DEB COOLCUT

## (undated) DEVICE — COVER,TABLE,44X76,STERILE: Brand: MEDLINE

## (undated) DEVICE — BLADE SHV L13CM DIA4MM CVD EXCALIBUR AGG COOLCUT

## (undated) DEVICE — SUTURE FIBERWIRE SZ 5 L38IN NONABSORBABLE BLU L48MM 1/2 AR7211

## (undated) DEVICE — SHEET,DRAPE,53X77,STERILE: Brand: MEDLINE

## (undated) DEVICE — CARDINAL HEALTH FLEXIBLE LIGHT HANDLE COVER: Brand: CARDINAL HEALTH

## (undated) DEVICE — 3M™ STERI-DRAPE™ U-DRAPE 1015: Brand: STERI-DRAPE™

## (undated) DEVICE — BANDAGE COMPR W6INXL10YD ST M E WHITE/BEIGE

## (undated) DEVICE — CANNULA SURG RT MIDDLE/POSTERIOR ZONENAVIGATOR SYS

## (undated) DEVICE — PROBE ABLAT 90DEG ASPIR MULTIPORT BPLR RF 1 PC ELECTRD ERGO

## (undated) DEVICE — HANDLE INSTR ZONE NAVIGATOR SYS

## (undated) DEVICE — PRECISION THIN (9.0 X 0.38 X 31.0MM)

## (undated) DEVICE — ELECTRODE PT RET AD L9FT HI MOIST COND ADH HYDRGEL CORDED

## (undated) DEVICE — SUTURE MCRYL SZ 2-0 L27IN ABSRB UD CP-1 1 L36MM 1/2 CIR REV Y266H

## (undated) DEVICE — SUTURE FIBERWIRE FIBERSTICK SZ 2-0 L50/12IN NONABSORBABLE AR7209

## (undated) DEVICE — SYRINGE IRRIG 60ML SFT PLIABLE BLB EZ TO GRP 1 HND USE W/

## (undated) DEVICE — 2.4 MM FLEXIBLE PASSING PINS,                                    STERILE 5 PER PACKAGE: Brand: ENDOBUTTON

## (undated) DEVICE — GOWN,PREVENTION PLUS,XLN/XL,ST,24/CS: Brand: MEDLINE

## (undated) DEVICE — STRIP,CLOSURE,WOUND,MEDI-STRIP,1/2X4: Brand: MEDLINE

## (undated) DEVICE — STOCKINETTE,IMPERVIOUS,12X48,STERILE: Brand: MEDLINE

## (undated) DEVICE — FAST-FIX 360 STRAIGHT KNOT                                    PUSHER/CUTTER AND SLOTTED CANNULA SETS: Brand: FAST-FIX

## (undated) DEVICE — PAD,ABDOMINAL,5"X9",ST,LF,25/BX: Brand: MEDLINE INDUSTRIES, INC.

## (undated) DEVICE — STRIPPER TENDON QUADPRO HARVESTER 10MM

## (undated) DEVICE — YANKAUER,BULB TIP,W/O VENT,RIGID,STERILE: Brand: MEDLINE

## (undated) DEVICE — 3M™ IOBAN™ 2 ANTIMICROBIAL INCISE DRAPE 6650EZ: Brand: IOBAN™ 2

## (undated) DEVICE — TUBING, SUCTION, 1/4" X 10', STRAIGHT: Brand: MEDLINE

## (undated) DEVICE — KNEE ARTHROSCOPY DR KOCH: Brand: MEDLINE INDUSTRIES, INC.

## (undated) DEVICE — NEEDLES 2-0 SUTURETAPE MENIS REP MINIMUM ORDER 10 EACH

## (undated) DEVICE — SPONGE GZ W4XL4IN COT 12 PLY TYP VII WVN C FLD DSGN STERILE

## (undated) DEVICE — COVER,LIGHT HANDLE,FLX,2/PK: Brand: MEDLINE INDUSTRIES, INC.